# Patient Record
Sex: FEMALE | Race: WHITE | NOT HISPANIC OR LATINO | Employment: STUDENT | URBAN - METROPOLITAN AREA
[De-identification: names, ages, dates, MRNs, and addresses within clinical notes are randomized per-mention and may not be internally consistent; named-entity substitution may affect disease eponyms.]

---
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
## 2020-11-09 NOTE — ED PROVIDER NOTES
[facility] EMERGENCY DEPARTMENT ([facility])    10/20/17       History     Chief Complaint   Patient presents with     Alcohol Problem     Intoxicated, found by EMS, [place] alone [place] concert [place] ([place] Minimally responsive, vomiting not protecting airway.      The history is provided by the EMS personnel. The history is limited by the condition of the patient (AMS).     Evelyn Casanova is a 16 year old female who presents to the Emergency Department via EMS intoxicated with alcohol. Patient was at the [facility] Homecoming Concert drinking, when she vomited. EMS was called. Patient denies any other illicit drug use.    I have reviewed the Medications, Allergies, Past Medical and Surgical History, and Social History in the Epic system.    History reviewed. No pertinent past medical history.    History reviewed. No pertinent surgical history.    No family history on file.    Social History   Substance Use Topics     Smoking status: Unknown If Ever Smoked     Smokeless tobacco: Not on file      Comment: JOSHUA, minimally responsive.      Alcohol use Yes       Current Facility-Administered Medications   Medication     ondansetron (ZOFRAN) injection 4 mg     No current outpatient prescriptions on file.      Not on File      Review of Systems   Unable to perform ROS: Mental status change       Physical Exam   BP: (!) 118/104  Pulse: 62  Heart Rate: 65  Temp: 97.3  F (36.3  C)  Resp: 15  SpO2: 98 %      Physical Exam   Constitutional: No distress.   Sleeping, but arousable. Smells heavily of alcohol.   HENT:   Head: Normocephalic and atraumatic.   Right Ear: External ear normal.   Left Ear: External ear normal.   Nose: Nose normal.   Mouth/Throat: Oropharynx is clear and moist. No oropharyngeal exudate.   Eyes: Conjunctivae and EOM are normal. Pupils are equal, round, and reactive to light. No scleral icterus.   Neck: Normal range of motion. Neck supple.   Cardiovascular: Normal rate, normal heart  sounds and intact distal pulses.    Pulmonary/Chest: Effort normal and breath sounds normal. No respiratory distress. She has no wheezes. She has no rales.   Abdominal: Soft. Bowel sounds are normal. There is no tenderness.   Musculoskeletal: Normal range of motion. She exhibits no edema or tenderness.   Neurological: No cranial nerve deficit. She exhibits normal muscle tone. Coordination normal. GCS eye subscore is 3. GCS verbal subscore is 5. GCS motor subscore is 6.   Skin: Skin is warm. No rash noted. She is not diaphoretic.   Psychiatric:   Unable to assess due to altered mental status.   Nursing note and vitals reviewed.      ED Course   8:18 PM  The patient was seen and examined by Chance Blue MD in Room ED01.     ED Course     Procedures             Critical Care time:  none             Labs Ordered and Resulted from Time of ED Arrival Up to the Time of Departure from the ED   ALCOHOL ETHYL - Abnormal; Notable for the following:        Result Value    Ethanol g/dL 0.27 (*)     All other components within normal limits   HCG QUALITATIVE   PULSE OXIMETRY NURSING   PERIPHERAL IV CATHETER            Assessments & Plan (with Medical Decision Making)   16-year-old woman brought in by EMS from a homecoming concert due to altered mental status. Differential diagnosis: intoxication, pregnancy, unlikely trauma and intracranial hemorrhage.    After thorough history and physical examination, patient appears to be in no acute distress. She is intoxicated and smell heavily of alcohol. She did 1 episodes of nonbloody, nonbilious emesis on arrival and I will treat her with IV Zofran. Vital signs are normal. She ll be monitored until sobriety.    Alcohol level returned elevated at 0.27. Pregnancy test is negative. Patient is still intoxicated. She'll be singed out to my partner pending sobriety, re-evaluation, and disposition.    I have reviewed the nursing notes.    I have reviewed the findings, diagnosis, plan and need for  follow up with the patient.    New Prescriptions    No medications on file       Final diagnoses:   Alcoholic intoxication without complication (H)     IReji, am serving as a trained medical scribe to document services personally performed by Chance Blue MD, based on the provider's statements to me.   Chance HILL MD, was physically present and have reviewed and verified the accuracy of this note documented by Reji Harley.    10/20/2017   [facility], [place], EMERGENCY DEPARTMENT     Shruthi Blue MD  10/20/17 0326     Patient has made progress over the course of this hospitalization. Patient reports feeling better, presenting with better insight and judgement. Patient denied any suicidal or homicidal ideations. Patient denied any auditory or visual hallucinations. Patient is future oriented, optimistic and motivated to participate in Outpatient treatment. Patient is stable for discharge and shows no imminent danger to self, others at this time. Patient understands and verbalized agreement with treatment plan and following up with outpatient.

## 2022-01-06 ENCOUNTER — MEDICAL CORRESPONDENCE (OUTPATIENT)
Dept: HEALTH INFORMATION MANAGEMENT | Facility: CLINIC | Age: 22
End: 2022-01-06
Payer: COMMERCIAL

## 2022-01-06 ENCOUNTER — TRANSFERRED RECORDS (OUTPATIENT)
Dept: HEALTH INFORMATION MANAGEMENT | Facility: CLINIC | Age: 22
End: 2022-01-06
Payer: COMMERCIAL

## 2022-01-06 LAB
C TRACH DNA SPEC QL PROBE+SIG AMP: NOT DETECTED
N GONORRHOEA DNA SPEC QL PROBE+SIG AMP: NOT DETECTED
SPECIMEN DESCRIP: NORMAL
SPECIMEN DESCRIPTION: NORMAL

## 2022-01-13 ENCOUNTER — TRANSCRIBE ORDERS (OUTPATIENT)
Dept: OTHER | Age: 22
End: 2022-01-13
Payer: COMMERCIAL

## 2022-01-13 ENCOUNTER — TELEPHONE (OUTPATIENT)
Dept: OBGYN | Facility: CLINIC | Age: 22
End: 2022-01-13
Payer: COMMERCIAL

## 2022-01-13 DIAGNOSIS — Q52.4 ABNORMALITY OF HYMEN: Primary | ICD-10-CM

## 2022-01-13 NOTE — TELEPHONE ENCOUNTER
[facility] Call Center    Phone Message    May a detailed message be left on voicemail: yes     Reason for Call: Other: Twyla called stating that her doctor from [facility] told her that she needed to be seen in 1-2 days. Please call Twyla back to discuss, thank you!     Action Taken: Other: whs    Travel Screening: Not Applicable

## 2022-01-13 NOTE — TELEPHONE ENCOUNTER
Reviewed referral with Dr. Ash, unless bleeding is heavy, pt does not need to be seen within 1-2 days. Can offer reschedule in the next 1-2 weeks.    Called patient and left VM to call back to discuss further.

## 2022-01-14 ENCOUNTER — OFFICE VISIT (OUTPATIENT)
Dept: OBGYN | Facility: CLINIC | Age: 22
End: 2022-01-14
Attending: OBSTETRICS & GYNECOLOGY
Payer: COMMERCIAL

## 2022-01-14 ENCOUNTER — TRANSCRIBE ORDERS (OUTPATIENT)
Dept: OTHER | Age: 22
End: 2022-01-14
Payer: COMMERCIAL

## 2022-01-14 VITALS — SYSTOLIC BLOOD PRESSURE: 115 MMHG | HEART RATE: 87 BPM | WEIGHT: 173 LBS | DIASTOLIC BLOOD PRESSURE: 81 MMHG

## 2022-01-14 DIAGNOSIS — Q52.4 ABNORMALITY OF HYMEN: Primary | ICD-10-CM

## 2022-01-14 DIAGNOSIS — Q52.4 ABNORMALITY OF HYMEN: ICD-10-CM

## 2022-01-14 PROCEDURE — 11200 RMVL SKIN TAGS UP TO&INC 15: CPT | Performed by: STUDENT IN AN ORGANIZED HEALTH CARE EDUCATION/TRAINING PROGRAM

## 2022-01-14 PROCEDURE — 99203 OFFICE O/P NEW LOW 30 MIN: CPT | Mod: 25 | Performed by: STUDENT IN AN ORGANIZED HEALTH CARE EDUCATION/TRAINING PROGRAM

## 2022-01-14 PROCEDURE — G0463 HOSPITAL OUTPT CLINIC VISIT: HCPCS

## 2022-01-14 PROCEDURE — 56700 PRTL HYMNCTMY/REVJ HYMNL RNG: CPT | Mod: GC | Performed by: STUDENT IN AN ORGANIZED HEALTH CARE EDUCATION/TRAINING PROGRAM

## 2022-01-14 NOTE — LETTER
1/14/2022       RE: Twyla Mayen  [address] [facility] [id number]     Dear Colleague,    Thank you for referring your patient, Twyla Mayen, to the [facility] [place] [facility]. Please see a copy of my visit note below.    [facility]  Gynecology Visit    Reason for Consult: Hymenal remnant  Consulting Provider: Rodolfo HERNÁNDEZ CNP    HPI:    Twyla Mayen is a 21 year old G0, here for hymenal remnant. Patient reports that she had intercourse 1/2/22 and now has a piece of skin hanging from her vagina. She had previously had intercourse with a different partner and had not had issues. She reports that the recent encounter was consensual and non-painful but she had bleeding immediately afterward. Has continued to have spotting and irritation over the past two weeks as the tissue rubs on her clothing    GYN History  - LMP: No LMP recorded. (Menstrual status: Birth Control).  - Menses: amenorrhea due to cOCPs  - Pap Smears: Two years ago, reported normal  - Contraception: cOCPs  - Sexual Activity/Concerns: Sexually active with men. No concerns  - Hx STIs/UTIs: None, recent GC/CT negative    OBHx  OB History   No obstetric history on file.       Meds:   Current Outpatient Medications   Medication     norgestrel-ethinyl estradiol (LO/OVRAL) 0.3-30 MG-MCG tablet     No current facility-administered medications for this visit.     Allergies:  None      ROS: 10-Point ROS negative except as noted in HPI    Physical Exam  /81   Pulse 87   Wt 78.5 kg (173 lb)   Breastfeeding No   Gen: Well-appearing, NAD  Pelvic:  Normal appearing labial architecture. Normal hair distribution. At the introitus, at the 12 o'clock position there is a remnant of hymenal tissue about 4 cm in length with a 6mm base attachment. At the distal end of the hymenal remnant, their is granulation tissue.    Procedure Note.   After obtaining  consent patient was placed in dorsal lithotomy, exam was performed as above. Hymenal tissue was prepped with three swabs of betadine. Base of the hymenal tissue was infiltrated with  3 cc 1% lidocaine and clamped with a hema until blanching achieved. Scalpel was then used to remove the excess tissue. 4-0 Vicryl was used for suture ligation. Tissue was hemostatic.     Assessment/Plan:  Twyla Mayen is a 21 year old G0. female here for hymenal remnant. 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 Removed as per above. Patient instructed to avoid tampons, intercourse for next 1-2 weeks. Patient tolerated procedure well      Return to clinic as needed.    Staffed with Dr. Maricarmen Toribio MD  Obstetrics & Gynecology, PGY-4  1/14/2022 4:56 PM    Patient was seen by the resident in Continuity of Care Clinic.  I reviewed the history & was present for the patient's exam and procedure.  The patient's assessment and plan were made jointly.    Yulia Hernadez MD MPH

## 2022-01-14 NOTE — PROGRESS NOTES
[facility]  Gynecology Visit    Reason for Consult: Hymenal remnant  Consulting Provider: Rodolfo HERÁNNDEZ CNP    HPI:    Twyla Mayen is a 21 year old G0, here for hymenal remnant. Patient reports that she had intercourse 1/2/22 and now has a piece of skin hanging from her vagina. She had previously had intercourse with a different partner and had not had issues. She reports that the recent encounter was consensual and non-painful but she had bleeding immediately afterward. Has continued to have spotting and irritation over the past two weeks as the tissue rubs on her clothing    GYN History  - LMP: No LMP recorded. (Menstrual status: Birth Control).  - Menses: amenorrhea due to cOCPs  - Pap Smears: Two years ago, reported normal  - Contraception: cOCPs  - Sexual Activity/Concerns: Sexually active with men. No concerns  - Hx STIs/UTIs: None, recent GC/CT negative    OBHx  OB History   No obstetric history on file.       Meds:   Current Outpatient Medications   Medication     norgestrel-ethinyl estradiol (LO/OVRAL) 0.3-30 MG-MCG tablet     No current facility-administered medications for this visit.     Allergies:  None      ROS: 10-Point ROS negative except as noted in HPI    Physical Exam  /81   Pulse 87   Wt 78.5 kg (173 lb)   Breastfeeding No   Gen: Well-appearing, NAD  Pelvic:  Normal appearing labial architecture. Normal hair distribution. At the introitus, at the 12 o'clock position there is a remnant of hymenal tissue about 4 cm in length with a 6mm base attachment. At the distal end of the hymenal remnant, their is granulation tissue.    Procedure Note.   After obtaining consent patient was placed in dorsal lithotomy, exam was performed as above. Hymenal tissue was prepped with three swabs of betadine. Base of the hymenal tissue was infiltrated with  3 cc 1% lidocaine and clamped with a hema until blanching achieved. Scalpel was then used to remove the excess tissue. 4-0 Vicryl was  used for suture ligation. Tissue was hemostatic.     Assessment/Plan:  Twyla Mayen is a 21 year old G0. female here for hymenal remnant. 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 Removed as per above. Patient instructed to avoid tampons, intercourse for next 1-2 weeks. Patient tolerated procedure well      Return to clinic as needed.    Staffed with Dr. Maricarmen Toribio MD  Obstetrics & Gynecology, PGY-4  1/14/2022 4:56 PM    Patient was seen by the resident in Continuity of Care Clinic.  I reviewed the history & was present for the patient's exam and procedure.  The patient's assessment and plan were made jointly.    Yulia Hernadez MD MPH

## 2022-08-25 ENCOUNTER — OFFICE VISIT (OUTPATIENT)
Dept: INTERNAL MEDICINE | Facility: CLINIC | Age: 22
End: 2022-08-25
Payer: COMMERCIAL

## 2022-08-25 VITALS
SYSTOLIC BLOOD PRESSURE: 119 MMHG | WEIGHT: 196.3 LBS | HEIGHT: 67 IN | OXYGEN SATURATION: 96 % | DIASTOLIC BLOOD PRESSURE: 78 MMHG | HEART RATE: 72 BPM | BODY MASS INDEX: 30.81 KG/M2 | RESPIRATION RATE: 16 BRPM

## 2022-08-25 DIAGNOSIS — Z71.84 COUNSELING FOR TRAVEL: ICD-10-CM

## 2022-08-25 DIAGNOSIS — Z71.84 COUNSELING FOR TRAVEL: Primary | ICD-10-CM

## 2022-08-25 DIAGNOSIS — Z23 NEED FOR DIPHTHERIA-TETANUS-PERTUSSIS (TDAP) VACCINE: ICD-10-CM

## 2022-08-25 PROCEDURE — 90471 IMMUNIZATION ADMIN: CPT | Performed by: NURSE PRACTITIONER

## 2022-08-25 PROCEDURE — 90715 TDAP VACCINE 7 YRS/> IM: CPT | Performed by: NURSE PRACTITIONER

## 2022-08-25 PROCEDURE — 90472 IMMUNIZATION ADMIN EACH ADD: CPT | Performed by: NURSE PRACTITIONER

## 2022-08-25 PROCEDURE — 99402 PREV MED CNSL INDIV APPRX 30: CPT | Mod: 25 | Performed by: NURSE PRACTITIONER

## 2022-08-25 PROCEDURE — 90691 TYPHOID VACCINE IM: CPT | Performed by: NURSE PRACTITIONER

## 2022-08-25 RX ORDER — ACETAZOLAMIDE 250 MG/1
TABLET ORAL
Qty: 6 TABLET | Refills: 0 | Status: SHIPPED | OUTPATIENT
Start: 2022-08-25 | End: 2022-08-31

## 2022-08-25 RX ORDER — AZITHROMYCIN 500 MG/1
TABLET, FILM COATED ORAL
Qty: 3 TABLET | Refills: 0 | Status: SHIPPED | OUTPATIENT
Start: 2022-08-25 | End: 2023-01-30

## 2022-08-25 RX ORDER — ATOVAQUONE AND PROGUANIL HYDROCHLORIDE 250; 100 MG/1; MG/1
1 TABLET, FILM COATED ORAL DAILY
Qty: 12 TABLET | Refills: 0 | Status: SHIPPED | OUTPATIENT
Start: 2022-08-25 | End: 2023-01-30

## 2022-08-25 NOTE — NURSING NOTE
"Twyla Mayen is a 21 year old female patient that presents today in clinic for the following:    Chief Complaint   Patient presents with     Travel Consultation     [place]     The patient's allergies and medications were reviewed as noted. A set of vitals were recorded as noted without incident: /78 (BP Location: Right arm, Patient Position: Sitting, Cuff Size: Adult Regular)   Pulse 72   Resp 16   Ht 1.702 m (5' 7\")   Wt 89 kg (196 lb 4.8 oz)   SpO2 96%   Breastfeeding No   BMI 30.74 kg/m  . The patient does not have any other questions for the provider.    Eric Mary, EMT at 2:28 PM on 8/25/2022  "

## 2022-08-25 NOTE — PATIENT INSTRUCTIONS
Creating a Medical Travel Kit    Every busy traveler should have a kit containing personal care and medical items that can be easily tossed into a suitcase.  Quantities can be adjusted for the length of travel, for destinations, and availability of replacement at your destination.    Plan to see your Travel medicine Clinic 4-6 weeks prior to departure as some vaccines require time to become effective.     Items to Pack:    -Basic antibiotics  -Mild laxatives: Metamucil or Senokot, available without a prescription.  -Anti-diarrheal medication: Imodium AD or Pepto Bismol tablets ( 2 tablet by mouth beofr meals reduces risk by 40-50%).  -Medical thermometer  -Antacid tablets  -Band aides, guaze bandages, ace wrap, adhesive tape, antibiotic ointment  -Topical ointment or creams for rash or bites (over the counter hydrocortisone cream).    -Motion sickness: Dramamine or Antivert (Meclazine)tablets, Sea bands  available at [facility] pharmacies over the counter.  -Aspirin, acetaminophen(tylenol) ibuprofen  -Cough medicine or drops  -Anti-fungal foot powder  -Nasal spray or decongestants  -Eyeglasses.  Take an extra pair or copy of your prescription.  _Insect repellent with 30% DEET time release, such as Ultrathon or Pulliam products. Available at [employer]  -Permethrin clothing insecticide treatment.Available at [employer] on line  -Ear Plugs  -Sunscreen  -Sunglasses, Hat  -Safety pins, toilet paper, money belt, padlock, duct tape    Regular Medications should be left in original containers and carried in your carry on bag. Take enough with you for the entire trip. You might need a letter to give to your insurance to obtain a sufficient amount for your trip.      Carry with you the following info: Known allergies, medical conditions, medications, name of your insurance company and policy number, blood type if known, and phone numbers for emergency [email or web address]          Travel Websites:     Wwwnc.cdc/gov  Wwwnc.cdc.gov/travel  Wwwnc.cdc.gov/travelt    Medical resources  Medical Alert [facility]  [phone]  Provides Medical Alert Tags    Passport Emergency Services [phone] (9 am TO 5 pm EST)  [phone] (after hours)  Deals with emergencies relating to passport problems.    [facility]  [phone]( Mon-Fri 8:30-10pm EST)  [phone] Emergencies only, 24 hour facilitator)  [phone] ( travel advisories recording) Provides information on current health conditions around the world.

## 2022-08-29 ENCOUNTER — TELEPHONE (OUTPATIENT)
Dept: OBGYN | Facility: CLINIC | Age: 22
End: 2022-08-29

## 2022-08-29 NOTE — TELEPHONE ENCOUNTER
[facility] Call Center    Phone Message    May a detailed message be left on voicemail: yes     Reason for Call: Other: Pt called and stated that she has been having abnormal bleeding - doesnt believe she is pregnant - is leaving for [place] on Friday and is wanting to make sure its OK to wait to be seen until after she comes home - please call pt to further discuss, thanks!     Action Taken: Message routed to:  Other: [facility]    Travel Screening: Not Applicable

## 2022-08-31 RX ORDER — ACETAZOLAMIDE 250 MG/1
TABLET ORAL
Qty: 6 TABLET | Refills: 0 | Status: SHIPPED | OUTPATIENT
Start: 2022-08-31 | End: 2023-01-30

## 2022-08-31 NOTE — TELEPHONE ENCOUNTER
ACETAZOLAMIDE 250 MG TABLET        Pharmacy comment: Script Clarification:PLEASE SPECIFY HOW MANY TABLETS THE PATIENT ID USING DAILY.  Skylar Sahu RN  Central Triage Red Flags/Med Refills

## 2022-09-17 ENCOUNTER — HEALTH MAINTENANCE LETTER (OUTPATIENT)
Age: 22
End: 2022-09-17

## 2022-12-08 NOTE — PROGRESS NOTES
Spoke to pt's mother Debi. Notified pt's mother to take pt to an urgent care per Dr. Campos's verbal. Pt's mother verbalized understanding with no further questions or concerns.    Twyla Mayen seen today alone in preparation for travel to [place], leaving on 9/2/22 and staying for 21 days. .Detailed itinerary: She arrives in [place] 9/2/22 and from there will travel to nearby [facility], and then [facility].. Then go to altitude at [facility] 4-5 days., then [facility] and back yo [facility], then4 days walking the [place] to [facility] and then back to [facility], and then [place].   Patient will be staying malarial and yellow fever  areas in [facility]. Reason for travel: pleasure.    Country of birth:[place].  She will be traveling with:her sister, and joining up with various tour groups. Planned activities during travel: visit to altitude, hiking.    She has never spent much time at significant altitude.    Risk level qualifier:    .    No past medical history on file.    There is no problem list on file for this patient.    Current Medical Problems: none    Immunization History   Administered Date(s) Administered     COVID-19,PF,Moderna 03/11/2021, 04/07/2021, 11/01/2021     DTaP, Unspecified 01/19/1999, 04/05/1999, 06/21/1999, 05/03/2000, 01/21/2004, 04/20/2010     HPV Quadrivalent 09/17/2012, 11/20/2012, 03/26/2013     Hep B, Peds or Adolescent 1998, 1998, 10/06/1999     HepA-Adult 05/18/2009, 01/05/2010     HepB-Adult 06/19/2019     Hib (PRP-T) 01/19/1999, 04/05/1999, 06/21/1999, 05/03/2000     Influenza Vaccine IM > 6 months Valent IIV4 (Alfuria,Fluzone) 10/05/2017, 10/02/2018, 09/16/2019, 09/24/2020, 09/15/2021     Influenza Vaccine, 6+MO IM (QUADRIVALENT W/PRESERVATIVES) 09/17/2012     Meningococcal Mcv4 Conjugate,unspecified  04/20/2010, 07/20/2016     Pneumococcal (PCV 7) 05/03/2000, 12/23/2000     Poliovirus, inactivated (IPV) 01/19/1999, 04/05/1999, 01/19/2000, 01/21/2004     Rotavirus, Unspecified Formulation 01/19/1999, 04/05/1999, 06/21/1999     TDAP Vaccine (Boostrix) 07/20/2016     Tdap (Adacel,Boostrix) 08/25/2022     Typhoid IM 08/25/2022     Varicella 01/21/2004,  "04/24/2007         REVIEW OF SYSTEMS:  ROS:9 system ROS reviewed w/o changes except for above        PE: /78 (BP Location: Right arm, Patient Position: Sitting, Cuff Size: Adult Regular)   Pulse 72   Resp 16   Ht 1.702 m (5' 7\")   Wt 89 kg (196 lb 4.8 oz)   SpO2 96%   Breastfeeding No   BMI 30.74 kg/m        General: well-nourished, well-developed female in no acute distress.    ASSESSMENT AND PLAN:    Twyla Mayen 21 year old old female whom I had the pleasure of seeing today for counseling for international travel.    Counseling  for travel:   Twyla was seen today for travel consultation.    Diagnoses and all orders for this visit:    Counseling for travel  -     azithromycin (ZITHROMAX) 500 MG tablet; 1 tab daily until diarrhea resolves.  -     atovaquone-proguanil (MALARONE) 250-100 MG tablet; Take 1 tablet by mouth daily Start 2 days before travel and continue 7 days after return.  -     TYPHOID VACCINE, IM  -     acetaZOLAMIDE (DIAMOX) 250 MG tablet; Start the day prior to ascending to 9000 ft.         -   -     She will be in a Yellow fever low risk area for 4 days and will use repellent and permethrine reated outerware    Need for diphtheria-tetanus-pertussis (Tdap) vaccine  -     TDAP (ADACEL,BOOSTRIX)      Counseling on malaria and other insect borne diseases    We also discussed general precautions for mosquito, other insect borne and ecto-parasite avoidance.    Counseling on traveler's diarrhea    We discussed precautions for clean water and food preparation as follows:  Recommended bottled or boiled water, including ice, and for tooth brushing.  Peel it, boil it, cook it, or forget it  Gave handout on traveler's diarrhea.  Handwashing or use of sanitizers several times daily and prior to eating  We discussed medications for management of traveler's diarrhea, once symptomatic:  Gave handout to patient.  Recommended going to clinic, if dehydrated or if high fever and/or blood in " "stool.  Recommended loperamide (Imodium, an antimotility agent), for diarrhea without fever\"}    Counseling on the epidemiology of travel related morbidity and mortality    Discussed precautions for accident avoidance.    Discussed flying: drinking plenty of fluids and exercising legs every 2 hours    Discussed health concerns overseas.    Gave copy of CDC recommendations.    Discussed safety and security:     Other counseling for travel    Recommended checking medicine cabinet - looking for any meds normally used at home for common illnesses.    Provided education regarding sun exposure and use of high SPF sunscreen.    Discussed avoidance of blood and body secretions.     High altitude sickness: discussed.    Motion sickness.    Jet lag: Not an issue.    HIV Post Exposure : aware                Creating a Medical Travel Kit    Every busy traveler should have a kit containing personal care and medical items that can be easily tossed into a suitcase.  Quantities can be adjusted for the length of travel, for destinations, and availability of replacement at your destination.    Plan to see your Travel medicine Clinic 4-6 weeks prior to departure as some vaccines require time to become effective.     Items to Pack:    -Basic antibiotics  -Mild laxatives: Metamucil or Senokot, available without a prescription.  -Anti-diarrheal medication: Imodium AD or Pepto Bismol, available in tablet form without a prescription.  -Medical thermometer  -Antacid tablets  -Band aides, guaze bandages, ace wrap, adhesive tape, antibiotic ointment  -Topical ointment or creams for rash or bites (over the counter hydrocortisone cream).    -Motion sickness: Dramamine or Antivert (Meclazine)tablets  -Aspirin, acetaminophen(tylenol) ibuprofen  -Cough medicine or drops  -Anti-fungal foot powder  -Nasal spray or decongestants  -Eyeglasses.  Take an extra pair or copy of your prescription.  _Insect repellent with 30% DEET time release, such as " Ultrathon or Pulliam products. Available at [employer]  -Permethrin clothing insecticide treatment.Available at [employer]  -Ear Plugs  -Sunscreen  -Sunglasses, Hat  -Safety pins, toilet paper, money belt, padlock, duct tape    Regular Medications should be left in original containers and carried in your carry on bag. Take enough with you for the entire trip. You might need a letter to give to your insurance to obtain a sufficient amount for your trip.      Carry with you the following info: Known allergies, medical conditions, medications, name of your insurance company and policy number, blood type if known, and phone numbers for emergency contact.        Medical resources  Medical Alert [facility]  [phone]  Provides Medical Alert Tags    Passport Emergency Services [phone] (9 am TO 5 pm EST)  [phone] (after hours)  Deals with emergencies relating to passport problems.    [facility]  [phone]( Mon-Fri 8:30-10pm EST)  [phone] Emergencies only, 24 hour facilitator)  [phone] ( travel advisories recording) Provides information on current health conditions around the world.    FOLLOW-UP PLAN:     Please see after visit summary for details regarding the patient's planned vaccination schedule.  For any labs ordered, we will contact the patient with a plan for further care.  The patient was also encouraged to be seen for any post-travel illness, or with future travel plans.    The total time spent during this visit for individual counseling was 40 minutes, all of which was spent in counseling time, including reviewing the past medical and vaccination history, the travel itinerary, the risks of travel and suggested precautions, and the recommended vaccines and medicines with their side effects -- all for upcoming travel.     Marion HERNÁNDEZ, CNP

## 2023-01-30 NOTE — PROGRESS NOTES
"[facility]    HPI: Twyal Mayen is a 22 year old G0 who presents to clinic today to discuss irregular menstrual bleeding.     In 7/22 had return of menses after 5 years without bleeding. Has been on continuous OCPs and prior to the last few months they have worked well for her. No problems with breakthrough bleeding in the past. When bleeding returned, started to occur every 2 weeks for a number of months. Last episode of bleeding was in December. Bleeding lasts about 5 days and seems to occur at the end of one pill pack and half way through the next. Feels a bit heavier than a normal period. Has associated pelvic cramping which is very uncomfortable along with mild mood changes/irritability. Prior to cOCPs had regular periods which were painful. Does feel she has gained weight in the last year and has been feeling more fatigued. She does not smoke. No personal history of VTE or PE. Does report that her father has familial cavernoma disease (?) and has had blood clots in the past. She has not been tested for this.     Agreeable to pap test and Gc/Ch screening today.     OB History:   G0     Gyn History:   Menses: bleeding q5frytw   STI Hx: None   Contraception: cOCPs  Sexual activity: yes, one sperm producing partner   Pap smear history: 2020 NILM (results not available)  S/p hymenal revision 1/14/22    Labs:   Gc/Ch neg 1/2022    Imaging:   None     PMH, PSH, Family history, Social history, medications and allergies were reviewed and updated in EMR.     Objective:   /72 (BP Location: Right arm, Patient Position: Chair)   Pulse 68   Ht 1.702 m (5' 7\")   Wt 94.3 kg (208 lb)   BMI 32.58 kg/m    General: Healthy appearing. Alert, oriented. Affect is appropriate.   HEENT: Eyes are normal with clear sclerae. Ears are symmetric.   Heart: Regular rate, well perfused.   Lungs: Breathing is unlabored.   Abdomen: Soft, nontender, without masses  Skin: Warm and dry without malignant " appearing lesions.   Pelvic exam: External genitalia without normal limits. Moist, rugated vaginal mucosa with physiologic discharge. Cervix is nulliparous, without lesions. A pap smear was obtained. Extremities: Skin is warm and dry. Lower extremities without varicosities or edema.   Neuro: Sensory and motor exam grossly intact.     Assessment/Plan:   Twyla Mayen is a 22 year old G0 female with breakthrough bleeding on continuous OCPs. Exam wnl, pap updated today, GC/CT collected. Pt uses OCPs for cycle control and contraception. Discussed other contraceptive options that could also impact menses today, pt desires to continue with OCPs for the time being. Did mention that her father has familial cavernosa disease (?) with h/o clots; she has not had a blood clot in the past but discussed that estrogen-containing contraceptive options may put her at higher risk for this if she has inherited the disease. Will look into it further and contact her via [employer] if we need to discuss another option.   - pap and Gc/Ch collected today  - CBC and TSH ordered   - discussed measures for control of breakthrough bleeding on cOCPs including trial of NSAIDs 600 mg q6h for 5 days as well as a break from OCPs x4 days as long as she has been taking them continuously for 3 weeks prior. She wishes to try these prior to switching to a different pill/method.     Janice Broussard MD  OB/GYN PGY-1  1/31/2023 10:03 AM     I examined Twyla Mayen on 1/31/2023 with Dr. Broussard and agree with the presentation, exam and plan of care documented in this note with edits by me.    Virginia Almanza MD

## 2023-01-31 ENCOUNTER — OFFICE VISIT (OUTPATIENT)
Dept: OBGYN | Facility: CLINIC | Age: 23
End: 2023-01-31
Attending: STUDENT IN AN ORGANIZED HEALTH CARE EDUCATION/TRAINING PROGRAM
Payer: COMMERCIAL

## 2023-01-31 ENCOUNTER — LAB (OUTPATIENT)
Dept: LAB | Facility: CLINIC | Age: 23
End: 2023-01-31
Attending: STUDENT IN AN ORGANIZED HEALTH CARE EDUCATION/TRAINING PROGRAM
Payer: COMMERCIAL

## 2023-01-31 VITALS
BODY MASS INDEX: 32.65 KG/M2 | SYSTOLIC BLOOD PRESSURE: 112 MMHG | WEIGHT: 208 LBS | HEART RATE: 68 BPM | DIASTOLIC BLOOD PRESSURE: 72 MMHG | HEIGHT: 67 IN

## 2023-01-31 DIAGNOSIS — R63.5 WEIGHT GAIN: ICD-10-CM

## 2023-01-31 DIAGNOSIS — N92.6 IRREGULAR MENSES: ICD-10-CM

## 2023-01-31 DIAGNOSIS — R53.83 OTHER FATIGUE: ICD-10-CM

## 2023-01-31 DIAGNOSIS — N92.6 IRREGULAR MENSES: Primary | ICD-10-CM

## 2023-01-31 DIAGNOSIS — Z12.4 CERVICAL CANCER SCREENING: ICD-10-CM

## 2023-01-31 DIAGNOSIS — Z30.41 SURVEILLANCE OF PREVIOUSLY PRESCRIBED CONTRACEPTIVE PILL: ICD-10-CM

## 2023-01-31 DIAGNOSIS — Z11.3 ROUTINE SCREENING FOR STI (SEXUALLY TRANSMITTED INFECTION): ICD-10-CM

## 2023-01-31 LAB
ERYTHROCYTE [DISTWIDTH] IN BLOOD BY AUTOMATED COUNT: 12 % (ref 10–15)
HCT VFR BLD AUTO: 39.9 % (ref 35–47)
HGB BLD-MCNC: 13.3 G/DL (ref 11.7–15.7)
MCH RBC QN AUTO: 30.2 PG (ref 26.5–33)
MCHC RBC AUTO-ENTMCNC: 33.3 G/DL (ref 31.5–36.5)
MCV RBC AUTO: 91 FL (ref 78–100)
PLATELET # BLD AUTO: 245 10E3/UL (ref 150–450)
RBC # BLD AUTO: 4.41 10E6/UL (ref 3.8–5.2)
TSH SERPL DL<=0.005 MIU/L-ACNC: 1.01 MU/L (ref 0.4–4)
WBC # BLD AUTO: 8.9 10E3/UL (ref 4–11)

## 2023-01-31 PROCEDURE — 99214 OFFICE O/P EST MOD 30 MIN: CPT | Mod: 25 | Performed by: STUDENT IN AN ORGANIZED HEALTH CARE EDUCATION/TRAINING PROGRAM

## 2023-01-31 PROCEDURE — 99214 OFFICE O/P EST MOD 30 MIN: CPT | Mod: GC | Performed by: STUDENT IN AN ORGANIZED HEALTH CARE EDUCATION/TRAINING PROGRAM

## 2023-01-31 PROCEDURE — 87491 CHLMYD TRACH DNA AMP PROBE: CPT | Performed by: STUDENT IN AN ORGANIZED HEALTH CARE EDUCATION/TRAINING PROGRAM

## 2023-01-31 PROCEDURE — 84443 ASSAY THYROID STIM HORMONE: CPT

## 2023-01-31 PROCEDURE — 36415 COLL VENOUS BLD VENIPUNCTURE: CPT

## 2023-01-31 PROCEDURE — 85027 COMPLETE CBC AUTOMATED: CPT

## 2023-01-31 PROCEDURE — G0145 SCR C/V CYTO,THINLAYER,RESCR: HCPCS | Performed by: STUDENT IN AN ORGANIZED HEALTH CARE EDUCATION/TRAINING PROGRAM

## 2023-01-31 ASSESSMENT — PATIENT HEALTH QUESTIONNAIRE - PHQ9
5. POOR APPETITE OR OVEREATING: SEVERAL DAYS
SUM OF ALL RESPONSES TO PHQ QUESTIONS 1-9: 9

## 2023-01-31 ASSESSMENT — ANXIETY QUESTIONNAIRES
GAD7 TOTAL SCORE: 4
GAD7 TOTAL SCORE: 4
5. BEING SO RESTLESS THAT IT IS HARD TO SIT STILL: NOT AT ALL
1. FEELING NERVOUS, ANXIOUS, OR ON EDGE: SEVERAL DAYS
3. WORRYING TOO MUCH ABOUT DIFFERENT THINGS: SEVERAL DAYS
7. FEELING AFRAID AS IF SOMETHING AWFUL MIGHT HAPPEN: NOT AT ALL
6. BECOMING EASILY ANNOYED OR IRRITABLE: NOT AT ALL
2. NOT BEING ABLE TO STOP OR CONTROL WORRYING: SEVERAL DAYS

## 2023-01-31 NOTE — PATIENT INSTRUCTIONS
Thank you for trusting us with your care!     If you need to contact us for questions about:  Symptoms, Scheduling & Medical Questions; Non-urgent (2-3 day response) Cecily message, Urgent (needing response today) [phone] (if after 3:30pm next day response)   Prescriptions: Please call your Pharmacy   Billing: Felix [phone] or  Physicians:[phone]    Try using 600mg of ibuprofen every 6 hrs for 5 days to help decrease the breakthrough bleeding.     You can also do a 4 day break in the birth control pills if you have been using them continuously for at least 3 weeks beforehand.

## 2023-01-31 NOTE — LETTER
Date:January 31, 2023      Provider requested that no letter be sent. Do not send.       [facility]

## 2023-01-31 NOTE — LETTER
"1/31/2023       RE: Twyla Mayen  [address] [address] [address]     Dear Colleague,    Thank you for referring your patient, Twyla Mayen, to the [facility] at [facility]. Please see a copy of my visit note below.    [facility]    HPI: Twyla Mayen is a 22 year old G0 who presents to clinic today to discuss irregular menstrual bleeding.     In 7/22 had return of menses after 5 years without bleeding. Has been on continuous OCPs and prior to the last few months they have worked well for her. No problems with breakthrough bleeding in the past. When bleeding returned, started to occur every 2 weeks for a number of months. Last episode of bleeding was in December. Bleeding lasts about 5 days and seems to occur at the end of one pill pack and half way through the next. Feels a bit heavier than a normal period. Has associated pelvic cramping which is very uncomfortable along with mild mood changes/irritability. Prior to cOCPs had regular periods which were painful. Does feel she has gained weight in the last year and has been feeling more fatigued. She does not smoke. No personal history of VTE or PE. Does report that her father has familial cavernoma disease (?) and has had blood clots in the past. She has not been tested for this.     Agreeable to pap test and Gc/Ch screening today.     OB History:   G0     Gyn History:   Menses: bleeding [id number]   STI Hx: None   Contraception: cOCPs  Sexual activity: yes, one sperm producing partner   Pap smear history: 2020 NILM (results not available)  S/p hymenal revision 1/14/22    Labs:   Gc/Ch neg 1/2022    Imaging:   None     PMH, PSH, Family history, Social history, medications and allergies were reviewed and updated in EMR.     Objective:   /72 (BP Location: Right arm, Patient Position: Chair)   Pulse 68   Ht 1.702 m (5' 7\")   Wt 94.3 kg (208 " lb)   BMI 32.58 kg/m    General: Healthy appearing. Alert, oriented. Affect is appropriate.   HEENT: Eyes are normal with clear sclerae. Ears are symmetric.   Heart: Regular rate, well perfused.   Lungs: Breathing is unlabored.   Abdomen: Soft, nontender, without masses  Skin: Warm and dry without malignant appearing lesions.   Pelvic exam: External genitalia without normal limits. Moist, rugated vaginal mucosa with physiologic discharge. Cervix is nulliparous, without lesions. A pap smear was obtained. Extremities: Skin is warm and dry. Lower extremities without varicosities or edema.   Neuro: Sensory and motor exam grossly intact.     Assessment/Plan:   Twyla Mayen is a 22 year old G0 female with breakthrough bleeding on continuous OCPs. Exam wnl, pap updated today, GC/CT collected. Pt uses OCPs for cycle control and contraception. Discussed other contraceptive options that could also impact menses today, pt desires to continue with OCPs for the time being. Did mention that her father has familial cavernosa disease (?) with h/o clots; she has not had a blood clot in the past but discussed that estrogen-containing contraceptive options may put her at higher risk for this if she has inherited the disease. Will look into it further and contact her via [employer] if we need to discuss another option.   - pap and Gc/Ch collected today  - CBC and TSH ordered   - discussed measures for control of breakthrough bleeding on cOCPs including trial of NSAIDs 600 mg q6h for 5 days as well as a break from OCPs x4 days as long as she has been taking them continuously for 3 weeks prior. She wishes to try these prior to switching to a different pill/method.     Janice Broussard MD  OB/GYN PGY-1  1/31/2023 10:03 AM     I examined Twyla Mayen on 1/31/2023 with Dr. Broussard and agree with the presentation, exam and plan of care documented in this note with edits by me.    Virginia Almanza MD               Again, thank you for allowing me  to participate in the care of your patient.      Sincerely,    Virginia Almanza MD

## 2023-02-01 LAB
C TRACH DNA SPEC QL PROBE+SIG AMP: NEGATIVE
N GONORRHOEA DNA SPEC QL NAA+PROBE: NEGATIVE

## 2023-02-02 LAB
BKR LAB AP GYN ADEQUACY: NORMAL
BKR LAB AP GYN INTERPRETATION: NORMAL
BKR LAB AP HPV REFLEX: NO
BKR LAB AP PREVIOUS ABNORMAL: NORMAL
PATH REPORT.COMMENTS IMP SPEC: NORMAL
PATH REPORT.COMMENTS IMP SPEC: NORMAL
PATH REPORT.RELEVANT HX SPEC: NORMAL

## 2023-02-04 ENCOUNTER — MYC MEDICAL ADVICE (OUTPATIENT)
Dept: OBGYN | Facility: CLINIC | Age: 23
End: 2023-02-04
Payer: COMMERCIAL

## 2023-02-04 DIAGNOSIS — Z30.41 SURVEILLANCE OF PREVIOUSLY PRESCRIBED CONTRACEPTIVE PILL: Primary | ICD-10-CM

## 2023-02-06 RX ORDER — NORETHINDRONE ACETATE AND ETHINYL ESTRADIOL 1MG-20(21)
1 KIT ORAL DAILY
Qty: 90 TABLET | Refills: 1 | Status: SHIPPED | OUTPATIENT
Start: 2023-02-06

## 2023-02-23 ENCOUNTER — OFFICE VISIT (OUTPATIENT)
Dept: DERMATOLOGY | Facility: CLINIC | Age: 23
End: 2023-02-23
Payer: COMMERCIAL

## 2023-02-23 DIAGNOSIS — D48.5 NEOPLASM OF UNCERTAIN BEHAVIOR OF SKIN: ICD-10-CM

## 2023-02-23 DIAGNOSIS — D22.9 MULTIPLE BENIGN NEVI: Primary | ICD-10-CM

## 2023-02-23 PROCEDURE — 99203 OFFICE O/P NEW LOW 30 MIN: CPT | Mod: GC | Performed by: DERMATOLOGY

## 2023-02-23 ASSESSMENT — PAIN SCALES - GENERAL: PAINLEVEL: NO PAIN (0)

## 2023-02-23 NOTE — PATIENT INSTRUCTIONS
Patient Education     Checking for Skin Cancer  You can find cancer early by checking your skin each month. There are 3 kinds of skin cancer. They are melanoma, basal cell carcinoma, and squamous cell carcinoma. Doing monthly skin checks is the best way to find new marks or skin changes. Follow the instructions below for checking your skin.   The ABCDEs of checking moles for melanoma   Check your moles or growths for signs of melanoma using ABCDE:   Asymmetry: the sides of the mole or growth don t match  Border: the edges are ragged, notched, or blurred  Color: the color within the mole or growth varies  Diameter: the mole or growth is larger than 6 mm (size of a pencil eraser)  Evolving: the size, shape, or color of the mole or growth is changing (evolving is not shown in the images below)    Checking for other types of skin cancer  Basal cell carcinoma or squamous cell carcinoma have symptoms such as:     A spot or mole that looks different from all other marks on your skin  Changes in how an area feels, such as itching, tenderness, or pain  Changes in the skin's surface, such as oozing, bleeding, or scaliness  A sore that does not heal  New swelling or redness beyond the border of a mole    Who s at risk?  Anyone can get skin cancer. But you are at greater risk if you have:   Fair skin, light-colored hair, or light-colored eyes  Many moles or abnormal moles on your skin  A history of sunburns from sunlight or tanning beds  A family history of skin cancer  A history of exposure to radiation or chemicals  A weakened immune system  If you have had skin cancer in the past, you are at risk for recurring skin cancer.   How to check your skin  Do your monthly skin checkups in front of a full-length mirror. Check all parts of your body, including your:   Head (ears, face, neck, and scalp)  Torso (front, back, and sides)  Arms (tops, undersides, upper, and lower armpits)  Hands (palms, backs, and fingers, including  under the nails)  Buttocks and genitals  Legs (front, back, and sides)  Feet (tops, soles, toes, including under the nails, and between toes)  If you have a lot of moles, take digital photos of them each month. Make sure to take photos both up close and from a distance. These can help you see if any moles change over time.   Most skin changes are not cancer. But if you see any changes in your skin, call your doctor right away. Only he or she can diagnose a problem. If you have skin cancer, seeing your doctor can be the first step toward getting the treatment that could save your life.   [employer] last reviewed this educational content on 4/1/2019 2000-2020 [employer]. [address] [place] [address], [place] [address]. All rights reserved. This information is not intended as a substitute for professional medical care. Always follow your healthcare professional's instructions.       When should I call my doctor?  If you are worsening or not improving, please, contact us or seek urgent care as noted below.     Who should I call with questions (adults)?  [facility] (adult and pediatric): [phone]  Health system (adult): [phone]  For urgent needs outside of business hours call the [facility] at [phone] and ask for the dermatology resident on call to be paged  If this is a medical emergency and you are unable to reach an ER, Call [phone]    Who should I call with questions (pediatric)?  [facility]- Pediatric Dermatology  Dr. Jenifer Otoole, Dr. Kem Arriaga, Dr. Adina Trujillo, PETE Vazquez, Dr. Georgia Isidro, Dr. Carla Morel & Dr. Kenrick Echols  Non-urgent nurse triage line; [phone]- Elly and Bharati DIAZ Care Coordinatorfrank Cheung (/Complex ) [phone]    If you need a prescription refill, please contact your pharmacy. Refills are approved or denied by our  Physicians during normal business hours, Monday through Fridays  Per office policy, refills will not be granted if you have not been seen within the past year (or sooner depending on your child's condition)    Scheduling Information:  Pediatric Appointment Scheduling and Call Center [phone]  Radiology Scheduling- [phone]  Sedation Unit Scheduling- [phone]  [place] Scheduling- General [phone]; Pediatric Dermatology [phone]  Main  Services: [phone]  Amharic: [phone]  [place] citizen: [phone]  Hmong/Israeli/Albanian: [phone]  Preadmission Nursing Department Fax Number: [phone] (Fax all pre-operative paperwork to this number)    For urgent matters arising during evenings, weekends, or holidays that cannot wait for normal business hours please call [phone] and ask for the dermatology resident on call to be paged.

## 2023-02-23 NOTE — NURSING NOTE
Dermatology Rooming Note    Twyla Mayen's goals for this visit include:   Chief Complaint   Patient presents with     Skin Check     Twyla is here today for a skin check - spot on left arm     MCKAYLA Ramirez

## 2023-02-23 NOTE — PROGRESS NOTES
[facility] Dermatology Note  Encounter Date: Feb 23, 2023  Office Visit     Dermatology Problem List:  # Cherry angioma vs traumatized nevus, L upper arm  - Photos 2/23/23.     ____________________________________________    Assessment & Plan:   # Cherry angioma vs traumatized nevus, L upper arm  - Discussed benign appearance of the above lesion. Does not require any treatment today.   - Recommended recheck in 6-12 months to ensure not growing or changing.   - Photos today.     # Multiple benign nevi, arms  - Reassured benign etiology. No treatment required.   - Discussed signs and symptoms of skin cancers and ABCDEs of melanoma.      Procedures Performed:   None    Follow-up: 6-12 months for recheck of L upper arm lesion.     Staff and Resident:     Resident:  I saw and discussed the patient with the attending physician, Dr. Cody Meza MD, PhD  PGY-2 Dermatology Resident     I have seen and examined this patient and agree with the assessment and plan as documented in the resident's note.    Brandon Ross MD  Dermatology Attending    ____________________________________________    CC: changing mole on arm    HPI:  Ms. Twyla Mayen is a(n) 22 year old female who presents today as a new patient for lesion of concern on L upper arm:    - Reports changing mole on L upper arm. Thinks it used to be brown like her other mole but in the past week has turned red. She does not think she's scratched it. It is not painful or bleeding. Has a few other moles on her arms she would like to have checked as well. Does not desire full skin check today.  - No personal history of skin cancer. Does not recall anyone in the family with melanoma.  - Generally wears sunscreen. No history of tanning bed use. A few bad sunburns in the past.       Labs Reviewed:  N/A    Physical Exam:  Vitals: There were no vitals taken for this visit.  SKIN: Focused examination of arms was performed.  - L upper arm  with ~2-3mm pink to bright red papule, somewhat vascular appearance on dermoscopy.   - Uniform-appearing, light brown macules and papules on the arms with benign dermatoscopic appearance.             Medications:  Current Outpatient Medications   Medication     norethindrone-ethinyl estradiol (JUNEL FE 1/20) 1-20 MG-MCG tablet     norgestrel-ethinyl estradiol (LO/OVRAL) 0.3-30 MG-MCG tablet     No current facility-administered medications for this visit.        Past Medical History:   There is no problem list on file for this patient.    No past medical history on file.    CC Referred Self, MD  No address on file on close of this encounter.

## 2023-02-23 NOTE — LETTER
Date:March 20, 2023      Provider requested that no letter be sent. Do not send.       [facility]

## 2023-02-23 NOTE — LETTER
2/23/2023       RE: Twyla Mayen  [address]   [place] [address]     Dear Colleague,    Thank you for referring your patient, Twyla Mayen, to the [facility] [place] at [facility]. Please see a copy of my visit note below.    [facility] Dermatology Note  Encounter Date: Feb 23, 2023  Office Visit     Dermatology Problem List:  # Cherry angioma vs traumatized nevus, L upper arm  - Photos 2/23/23.     ____________________________________________    Assessment & Plan:   # Cherry angioma vs traumatized nevus, L upper arm  - Discussed benign appearance of the above lesion. Does not require any treatment today.   - Recommended recheck in 6-12 months to ensure not growing or changing.   - Photos today.     # Multiple benign nevi, arms  - Reassured benign etiology. No treatment required.   - Discussed signs and symptoms of skin cancers and ABCDEs of melanoma.      Procedures Performed:   None    Follow-up: 6-12 months for recheck of L upper arm lesion.     Staff and Resident:     Resident:  I saw and discussed the patient with the attending physician, Dr. Cody Meza MD, PhD  PGY-2 Dermatology Resident     I have seen and examined this patient and agree with the assessment and plan as documented in the resident's note.    Brandon Ross MD  Dermatology Attending    ____________________________________________    CC: changing mole on arm    HPI:  Ms. Twyla Mayen is a(n) 22 year old female who presents today as a new patient for lesion of concern on L upper arm:    - Reports changing mole on L upper arm. Thinks it used to be brown like her other mole but in the past week has turned red. She does not think she's scratched it. It is not painful or bleeding. Has a few other moles on her arms she would like to have checked as well. Does not desire full skin check today.  - No personal history of  skin cancer. Does not recall anyone in the family with melanoma.  - Generally wears sunscreen. No history of tanning bed use. A few bad sunburns in the past.       Labs Reviewed:  N/A    Physical Exam:  Vitals: There were no vitals taken for this visit.  SKIN: Focused examination of arms was performed.  - L upper arm with ~2-3mm pink to bright red papule, somewhat vascular appearance on dermoscopy.   - Uniform-appearing, light brown macules and papules on the arms with benign dermatoscopic appearance.             Medications:  Current Outpatient Medications   Medication     norethindrone-ethinyl estradiol (JUNEL FE 1/20) 1-20 MG-MCG tablet     norgestrel-ethinyl estradiol (LO/OVRAL) 0.3-30 MG-MCG tablet     No current facility-administered medications for this visit.        Past Medical History:   There is no problem list on file for this patient.    No past medical history on file.    CC Referred Self, MD  No address on file on close of this encounter.          Again, thank you for allowing me to participate in the care of your patient.      Sincerely,    Brandon Ross MD

## 2023-03-14 ENCOUNTER — PATIENT OUTREACH (OUTPATIENT)
Dept: DERMATOLOGY | Facility: CLINIC | Age: 23
End: 2023-03-14
Payer: COMMERCIAL

## 2023-03-14 NOTE — TELEPHONE ENCOUNTER
Attempted to reach patient to schedule follow up in the Dermatology Clinic.  No answer,  LM on VM to call office and Tweetwall message sent..    Schedule with Dr. Brandon Ross 8/2023.

## 2023-03-18 PROBLEM — D22.9 MULTIPLE BENIGN NEVI: Status: ACTIVE | Noted: 2023-03-18

## 2023-03-18 PROBLEM — D48.5 NEOPLASM OF UNCERTAIN BEHAVIOR OF SKIN: Status: ACTIVE | Noted: 2023-03-18

## 2023-10-07 ENCOUNTER — HEALTH MAINTENANCE LETTER (OUTPATIENT)
Age: 23
End: 2023-10-07

## 2024-02-07 ENCOUNTER — ALLIED HEALTH/NURSE VISIT (OUTPATIENT)
Dept: INTERNAL MEDICINE | Facility: CLINIC | Age: 24
End: 2024-02-07
Payer: COMMERCIAL

## 2024-02-07 DIAGNOSIS — Z11.1 SCREENING EXAMINATION FOR PULMONARY TUBERCULOSIS: Primary | ICD-10-CM

## 2024-02-07 PROCEDURE — 86580 TB INTRADERMAL TEST: CPT

## 2024-02-07 PROCEDURE — 99207 PR NO CHARGE NURSE ONLY: CPT

## 2024-02-07 RX ORDER — NORETHINDRONE ACETATE AND ETHINYL ESTRADIOL, ETHINYL ESTRADIOL AND FERROUS FUMARATE 1MG-10(24)
KIT ORAL SEE ADMIN INSTRUCTIONS
COMMUNITY
Start: 2023-12-12

## 2024-02-07 NOTE — PROGRESS NOTES
Patient came to clinic to receive TB skin test, per pt. Patient was given Mantoux test with no complications and told to return to clinic in two days for evaluation of injection site.     Melissa Britt, EMT at 11:11 AM on 2/7/2024

## 2024-02-09 ENCOUNTER — ALLIED HEALTH/NURSE VISIT (OUTPATIENT)
Dept: INTERNAL MEDICINE | Facility: CLINIC | Age: 24
End: 2024-02-09
Payer: COMMERCIAL

## 2024-02-09 DIAGNOSIS — Z11.1 SCREENING EXAMINATION FOR PULMONARY TUBERCULOSIS: Primary | ICD-10-CM

## 2024-02-09 PROCEDURE — 99207 PR NO CHARGE NURSE ONLY: CPT

## 2024-02-09 NOTE — PROGRESS NOTES
Twyla was seen today for a nurse visit for follow up on TB test. Injection site visible but no signs of inflammation or induration. Tuberculin test is negative.     Edmundo Jewell RN on 2/9/2024 at 1:26 PM

## 2024-02-09 NOTE — PROGRESS NOTES
Patient came to clinic for TB reading, two days post injection. Injection site was observed and confirmed to be negative by Zach Mendell, RN.     Melissa Britt, EMT at 1:23 PM on 2/9/2024

## 2024-02-14 ENCOUNTER — ALLIED HEALTH/NURSE VISIT (OUTPATIENT)
Dept: INTERNAL MEDICINE | Facility: CLINIC | Age: 24
End: 2024-02-14
Payer: COMMERCIAL

## 2024-02-14 DIAGNOSIS — Z11.1 SCREENING EXAMINATION FOR PULMONARY TUBERCULOSIS: Primary | ICD-10-CM

## 2024-02-14 PROCEDURE — 86580 TB INTRADERMAL TEST: CPT

## 2024-02-14 PROCEDURE — 99207 PR NO CHARGE NURSE ONLY: CPT

## 2024-02-14 NOTE — PROGRESS NOTES
Patient came to clinic for a second TB skin test per pt. The area was cleaned with an alcohol swab and the injection was completed without incident. The patient was informed to return to clinic in two days to have the skin test read. The patient is scheduled to return on 02/16/24.     ELLI Birch at 12:35 PM on 2/14/2024

## 2024-02-16 ENCOUNTER — ALLIED HEALTH/NURSE VISIT (OUTPATIENT)
Dept: INTERNAL MEDICINE | Facility: CLINIC | Age: 24
End: 2024-02-16
Payer: COMMERCIAL

## 2024-02-16 DIAGNOSIS — Z11.1 SCREENING EXAMINATION FOR PULMONARY TUBERCULOSIS: Primary | ICD-10-CM

## 2024-02-16 PROCEDURE — 99207 PR NO CHARGE NURSE ONLY: CPT

## 2024-02-16 NOTE — PROGRESS NOTES
Patient is here today for a Mantoux (TST) test results.    Did patient return to clinic 48-72 hours from Mantoux (TST) placement: Yes -     Induration Size? Induration <5mm - Enter results in Enter/Edit Activity. Route results to ordering provider.     Patient needs form signed? No    Does patient need a two step? Patient presented to the clinic today for 2nd reading.     Daniela Ren RN on 2/16/2024 at 9:45 AM

## 2024-06-10 ENCOUNTER — ALLIED HEALTH/NURSE VISIT (OUTPATIENT)
Dept: INTERNAL MEDICINE | Facility: CLINIC | Age: 24
End: 2024-06-10
Payer: COMMERCIAL

## 2024-06-10 DIAGNOSIS — R05.9 COUGH, UNSPECIFIED TYPE: Primary | ICD-10-CM

## 2024-06-10 LAB
FLUAV RNA SPEC QL NAA+PROBE: NEGATIVE
FLUBV RNA RESP QL NAA+PROBE: NEGATIVE
RSV RNA SPEC NAA+PROBE: NEGATIVE
SARS-COV-2 RNA RESP QL NAA+PROBE: NEGATIVE

## 2024-06-10 PROCEDURE — 99207 PR NO CHARGE NURSE ONLY: CPT

## 2024-06-10 PROCEDURE — 87637 SARSCOV2&INF A&B&RSV AMP PRB: CPT | Performed by: INTERNAL MEDICINE

## 2024-06-10 PROCEDURE — 99000 SPECIMEN HANDLING OFFICE-LAB: CPT | Performed by: PATHOLOGY

## 2024-06-10 NOTE — PROGRESS NOTES
Patient presented in clinic for a covid-19 swab, performed a Covid-19/influenza/RSV swab. Pt tolerated swab well. Dr. Coon was present in clinic if needed.     Melissa Britt, EMT at 1:28 PM on 6/10/2024

## 2024-09-12 ENCOUNTER — ALLIED HEALTH/NURSE VISIT (OUTPATIENT)
Dept: INTERNAL MEDICINE | Facility: CLINIC | Age: 24
End: 2024-09-12
Payer: COMMERCIAL

## 2024-09-12 DIAGNOSIS — R11.0 NAUSEA: Primary | ICD-10-CM

## 2024-09-12 PROCEDURE — 87637 SARSCOV2&INF A&B&RSV AMP PRB: CPT | Performed by: INTERNAL MEDICINE

## 2024-09-12 PROCEDURE — 99000 SPECIMEN HANDLING OFFICE-LAB: CPT | Performed by: PATHOLOGY

## 2024-09-12 PROCEDURE — 99207 PR NO CHARGE NURSE ONLY: CPT

## 2024-09-12 NOTE — PROGRESS NOTES
Patient came to clinic for a swab for possible viral infection. Pt was tested under the supervision of Dr. Goff.     ELLI Birch at 2:20 PM on 9/12/2024       room air

## 2024-10-19 ASSESSMENT — SLEEP AND FATIGUE QUESTIONNAIRES
HOW LIKELY ARE YOU TO NOD OFF OR FALL ASLEEP WHILE WATCHING TV: SLIGHT CHANCE OF DOZING
HOW LIKELY ARE YOU TO NOD OFF OR FALL ASLEEP WHILE SITTING INACTIVE IN A PUBLIC PLACE: MODERATE CHANCE OF DOZING
HOW LIKELY ARE YOU TO NOD OFF OR FALL ASLEEP IN A CAR, WHILE STOPPED FOR A FEW MINUTES IN TRAFFIC: MODERATE CHANCE OF DOZING
HOW LIKELY ARE YOU TO NOD OFF OR FALL ASLEEP WHEN YOU ARE A PASSENGER IN A CAR FOR AN HOUR WITHOUT A BREAK: HIGH CHANCE OF DOZING
HOW LIKELY ARE YOU TO NOD OFF OR FALL ASLEEP WHILE SITTING AND TALKING TO SOMEONE: SLIGHT CHANCE OF DOZING
HOW LIKELY ARE YOU TO NOD OFF OR FALL ASLEEP WHILE LYING DOWN TO REST IN THE AFTERNOON WHEN CIRCUMSTANCES PERMIT: HIGH CHANCE OF DOZING
HOW LIKELY ARE YOU TO NOD OFF OR FALL ASLEEP WHILE SITTING QUIETLY AFTER LUNCH WITHOUT ALCOHOL: HIGH CHANCE OF DOZING
HOW LIKELY ARE YOU TO NOD OFF OR FALL ASLEEP WHILE SITTING AND READING: HIGH CHANCE OF DOZING

## 2024-10-22 ENCOUNTER — VIRTUAL VISIT (OUTPATIENT)
Dept: SLEEP MEDICINE | Facility: CLINIC | Age: 24
End: 2024-10-22
Payer: COMMERCIAL

## 2024-10-22 VITALS — WEIGHT: 210 LBS | HEIGHT: 67 IN | BODY MASS INDEX: 32.96 KG/M2

## 2024-10-22 DIAGNOSIS — G47.19 EXCESSIVE DAYTIME SLEEPINESS: ICD-10-CM

## 2024-10-22 DIAGNOSIS — G47.10 ORGANIC HYPERSOMNIA: Primary | ICD-10-CM

## 2024-10-22 DIAGNOSIS — R06.89 DYSPNEA AND RESPIRATORY ABNORMALITY: ICD-10-CM

## 2024-10-22 DIAGNOSIS — E66.09 CLASS 1 OBESITY DUE TO EXCESS CALORIES WITH BODY MASS INDEX (BMI) OF 32.0 TO 32.9 IN ADULT, UNSPECIFIED WHETHER SERIOUS COMORBIDITY PRESENT: ICD-10-CM

## 2024-10-22 DIAGNOSIS — E66.811 CLASS 1 OBESITY DUE TO EXCESS CALORIES WITH BODY MASS INDEX (BMI) OF 32.0 TO 32.9 IN ADULT, UNSPECIFIED WHETHER SERIOUS COMORBIDITY PRESENT: ICD-10-CM

## 2024-10-22 DIAGNOSIS — R06.00 DYSPNEA AND RESPIRATORY ABNORMALITY: ICD-10-CM

## 2024-10-22 DIAGNOSIS — Z72.820 LACK OF ADEQUATE SLEEP: ICD-10-CM

## 2024-10-22 PROCEDURE — 99204 OFFICE O/P NEW MOD 45 MIN: CPT | Mod: 95 | Performed by: PHYSICIAN ASSISTANT

## 2024-10-22 ASSESSMENT — PAIN SCALES - GENERAL: PAINLEVEL: NO PAIN (0)

## 2024-10-22 NOTE — NURSING NOTE
Current patient location:  work     Is the patient currently in the state [facility]? YES    Visit mode:VIDEO    If the visit is dropped, the patient can be reconnected by: VIDEO VISIT: Text to cell phone:   Telephone Information:   Mobile [phone]       Will anyone else be joining the visit? NO  (If patient encounters technical issues they should call [phone]    Are changes needed to the allergy or medication list? Pt stated no med changes    Are refills needed on medications prescribed by this physician? NO    Rooming Documentation:  Questionnaire(s) completed    Reason for visit: Consult and Sleep Apnea    Farhana CESPEDESF

## 2024-10-22 NOTE — PATIENT INSTRUCTIONS
"          MY TREATMENT INFORMATION FOR SLEEP APNEA-  Twyla Liconaisamar    DOCTOR : Dony Richey PA    Am I having a sleep study at a sleep center?  --->Due to normal delays, you will be contacted within 2-4 weeks to schedule    Am I having a home sleep study?  --->Watch the video for the device you are using:    -/drop off device-   https://www.HotClickVideoube.com/watch?v=yGGFBdELGhk    -Disposable device sent out require phone/computer application-   https://www."[employer].com/watch?v=BCce_vbiwxE      Frequently asked questions:  1. What is Obstructive Sleep Apnea (SRIDEVI)? SRIDEVI is the most common type of sleep apnea. Apnea means, \"without breath.\"  Apnea is most often caused by narrowing or collapse of the upper airway as muscles relax during sleep.   Almost everyone has occasional apneas. Most people with sleep apnea have had brief interruptions at night frequently for many years.  The severity of sleep apnea is related to how frequent and severe the events are.   2. What are the consequences of SRIDEVI? Symptoms include: feeling sleepy during the day, snoring loudly, gasping or stopping of breathing, trouble sleeping, and occasionally morning headaches or heartburn at night.  Sleepiness can be serious and even increase the risk of falling asleep while driving. Other health consequences may include development of high blood pressure and other cardiovascular disease in persons who are susceptible. Untreated SRIDEVI  can contribute to heart disease, stroke and diabetes.   3. What are the treatment options? In most situations, sleep apnea is a lifelong disease that must be managed with daily therapy. Medications are not effective for sleep apnea and surgery is generally not considered until other therapies have been tried. Your treatment is your choice . Continuous Positive Airway (CPAP) works right away and is the therapy that is effective in nearly everyone. An oral device to hold your jaw forward is usually the next most " reliable option. Other options include postioning devices (to keep you off your back), weight loss, and surgery including a tongue pacing device. There is more detail about some of these options below.  4. Are my sleep studies covered by insurance? Although we will request verification of coverage, we advise you also check in advance of the study to ensure there is coverage.    Important tips for those choosing CPAP and similar devices  REMEMBER-IF YOU RECEIVE A CALL FROM  [phone]-->IT IS TO SETUP A DEVICE  For new devices, sign up for device JUAN to monitor your device for your followup visits  We encourage you to utilize the [employer] juan or website ( https://Social & Loyal.[employer]/ ) to monitor your therapy progress and share the data with your healthcare team when you discuss your sleep apnea.                                                    Know your equipment:  CPAP is continuous positive airway pressure that prevents obstructive sleep apnea by keeping the throat from collapsing while you are sleeping. In most cases, the device is  smart  and can slowly self-adjusts if your throat collapses and keeps a record every day of how well you are treated-this information is available to you and your care team.  BPAP is bilevel positive airway pressure that keeps your throat open and also assists each breath with a pressure boost to maintain adequate breathing.  Special kinds of BPAP are used in patients who have inadequate breathing from lung or heart disease. In most cases, the device is  smart  and can slowly self-adjusts to assist breathing. Like CPAP, the device keeps a record of how well you are treated.  Your mask is your connection to the device. You get to choose what feels most comfortable and the staff will help to make sure if fits. Here: are some examples of the different masks that are available: Magnetic mask aids may assist with use but there are safety issues that should be addressed when  considering with magnets* ( see end of discussion).       Key points to remember on your journey with sleep apnea:  Sleep study.  PAP devices often need to be adjusted during a sleep study to show that they are effective and adjusted right.  Good tips to remember: Try wearing just the mask during a quiet time during the day so your body adapts to wearing it. A humidifier is recommended for comfort in most cases to prevent drying of your nose and throat. Allergy medication from your provider may help you if you are having nasal congestion.  Getting settled-in. It takes more than one night for most of us to get used to wearing a mask. Try wearing just the mask during a quiet time during the day so your body adapts to wearing it. A humidifier is recommended for comfort in most cases. Our team will work with you carefully on the first day and will be in contact within 4 days and again at 2 and 4 weeks for advice and remote device adjustments. Your therapy is evaluated by the device each day.   Use it every night. The more you are able to sleep naturally for 7-8 hours, the more likely you will have good sleep and to prevent health risks or symptoms from sleep apnea. Even if you use it 4 hours it helps. Occasionally all of us are unable to use a medical therapy, in sleep apnea, it is not dangerous to miss one night.   Communicate. Call our skilled team on the number provided on the first day if your visit for problems that make it difficult to wear the device. Over 2 out of 3 patients can learn to wear the device long-term with help from our team. Remember to call our team or your sleep providers if you are unable to wear the device as we may have other solutions for those who cannot adapt to mask CPAP therapy. It is recommended that you sleep your sleep provider within the first 3 months and yearly after that if you are not having problems.   Use it for your health. We encourage use of CPAP masks during daytime quiet  periods to allow your face and brain to adapt to the sensation of CPAP so that it will be a more natural sensation to awaken to at night or during naps. This can be very useful during the first few weeks or months of adapting to CPAP though it does not help medically to wear CPAP during wakefulness and  should not be used as a strategy just to meet guidelines.  Take care of your equipment. Make sure you clean your mask and tubing using directions every day and that your filter and mask are replaced as recommended or if they are not working.     *Masks with magnets:  Updated Contraindications  Masks with magnetic components are contraindicated for use by patients where they, or anyone in close physical contact while using the mask, have the following:   Active medical implants that interact with magnets (i.e., pacemakers, implantable cardioverter defibrillators (ICD), neurostimulators, cerebrospinal fluid (CSF) shunts, insulin/infusion pumps)   Metallic implants/objects containing ferromagnetic material (i.e., aneurysm clips/flow disruption devices, embolic coils, stents, valves, electrodes, implants to restore hearing or balance with implanted magnets, ocular implants, metallic splinters in the eye)  Updated Warning  Keep the mask magnets at a safe distance of at least 6 inches (150 mm) away from implants or medical devices that may be adversely affected by magnetic interference. This warning applies to you or anyone in close physical contact with your mask. The magnets are in the frame and lower headgear clips, with a magnetic field strength of up to 400mT. When worn, they connect to secure the mask but may inadvertently detach while asleep.  Implants/medical devices, including those listed within contraindications, may be adversely affected if they change function under external magnetic fields or contain ferromagnetic materials that attract/repel to magnetic fields (some metallic implants, e.g., contact lenses  with metal, dental implants, metallic cranial plates, screws, charlotte hole covers, and bone substitute devices). Consult your physician and  of your implant / other medical device for information on the potential adverse effects of magnetic fields.    BESIDES CPAP, WHAT OTHER THERAPIES ARE THERE?    Positioning Device  Positioning devices are generally used when sleep apnea is mild and only occurs on your back.This example shows a pillow that straps around the waist. It may be appropriate for those whose sleep study shows milder sleep apnea that occurs primarily when lying flat on one's back. Preliminary studies have shown benefit but effectiveness at home may need to be verified by a home sleep test. These devices are generally not covered by medical insurance.  Examples of devices that maintain sleeping on the back to prevent snoring and mild sleep apnea.    Belt type body positioner  http://You[employer] Beauty/    Electronic reminder  http://nightshifttherapy.BitGym/            Oral Appliance  What is oral appliance therapy?  An oral appliance device fits on your teeth at night like a retainer used after having braces. The device is made by a specialized dentist and requires several visits over 1-2 months before a manufactured device is made to fit your teeth and is adjusted to prevent your sleep apnea. Once an oral device is working properly, snoring should be improved. A home sleep test may be recommended at that time if to determine whether the sleep apnea is adequately treated.       Some things to remember:  -Oral devices are often, but not always, covered by your medical insurance. Be sure to check with your insurance provider.   -If you are referred for oral therapy, you will be given a list of specialized dentists to consider or you may choose to visit the Web site of the American Academy of Dental Sleep Medicine  -Oral devices are less likely to work if you have severe sleep apnea or are extremely  overweight.     More detailed information  An oral appliance is a small acrylic device that fits over the upper and lower teeth  (similar to a retainer or a mouth guard). This device slightly moves jaw forward, which moves the base of the tongue forward, opens the airway, improves breathing for effective treat snoring and obstructive sleep apnea in perhaps 7 out of 10 people .  The best working devices are custom-made by a dental device  after a mold is made of the teeth 1, 2, 3.  When is an oral appliance indicated?  Oral appliance therapy is recommended as a first-line treatment for patients with primary snoring, mild sleep apnea, and for patients with moderate sleep apnea who prefer appliance therapy to use of CPAP4, 5. Severity of sleep apnea is determined by sleep testing and is based on the number of respiratory events per hour of sleep.   How successful is oral appliance therapy?  The success rate of oral appliance therapy in patients with mild sleep apnea is 75-80% while in patients with moderate sleep apnea it is 50-70%. The chance of success in patients with severe sleep apnea is 40-50%. The research also shows that oral appliances have a beneficial effect on the cardiovascular health of SRIDEVI patients at the same magnitude as CPAP therapy7.  Oral appliances should be a second-line treatment in cases of severe sleep apnea, but if not completely successful then a combination therapy utilizing CPAP plus oral appliance therapy may be effective. Oral appliances tend to be effective in a broad range of patients although studies show that the patients who have the highest success are females, younger patients, those with milder disease, and less severe obesity. 3, 6.   Finding a dentist that practices dental sleep medicine  Specific training is available through the American Academy of Dental Sleep Medicine for dentists interested in working in the field of sleep. To find a dentist who is educated in  the field of sleep and the use of oral appliances, near you, visit the Web site of the American Academy of Dental Sleep Medicine.    References  1. Leyla, et al. Objectively measured vs self-reported compliance during oral appliance therapy for sleep-disordered breathing. Chest 2013; 144(5): 8769-0704.  2. Jose et al. Objective measurement of compliance during oral appliance therapy for sleep-disordered breathing. Thorax 2013; 68(1): 91-96.  3. Lauro et al. Mandibular advancement devices in 620 men and women with SRIDEVI and snoring: tolerability and predictors of treatment success. Chest 2004; 125: 3339-9766.  4. Jovan, et al. Oral appliances for snoring and SRIDEVI: a review. Sleep 2006; 29: 244-262.  5. Charlotte et al. Oral appliance treatment for SRIDEVI: an update. J Clin Sleep Med 2014; 10(2): 215-227.  6. Lenka et al. Predictors of OSAH treatment outcome. J Dent Res 2007; 86: 7727-8028.      Weight Loss:   Your Body mass index is 32.89 kg/m .    Being overweight does not necessarily mean you will have health consequences.  Those who have BMI over 35 or over 27 with existing medical conditions carries greater risk.   Weight loss decreases severity of sleep apnea in most people with obesity. For those with mild obesity who have developed snoring with weight gain, even 15-30 pound weight loss can improve and occasionally milder eliminate sleep apnea.  Structured and life-long dietary and health habits are necessary to lose weight and keep healthier weight levels.     The Comprehensive Weight loss program offers all aspects of weight loss strategies including two Non-Surgical Weight Loss Programs: Medical Weight Management and our 24 Week Healthy Lifestyle Program:    Medical Weight Management: You will meet with a Medical Weight Management Provider, as well as a Registered Dietician. The program may include medication therapy, dietary education, recommended exercise and physical therapy programs,  monthly support group meetings, and possible psychological counseling. Follow up visits with the provider or dietician are scheduled based on your progress and needs.    24 Week Healthy Lifestyle Program: This unique program is designed to give you the support of weekly appointments and activities thru a 24-week period. It may include all of the components of the basic program (above), with the addition of 11 individual Health  Visits, 24-week access to the [employer] website for over 700 online classes, and monthly support group meetings. This program has an out-of-pocket expense of $499 to cover the items that can not be billed to insurance (health coaches and [employer] access), and is non-refundable/non-transferable (you may be able to use a Health Savings Account; ask your HSA provider). There may be an optional meal replacement plan prescribed as well.   Surgical management achieves meaningful long-term weight loss and improvement in health risks in most patients with more severe obesity.      Sleep Apnea Surgery:    Surgery for obstructive sleep apnea is considered generally only when other therapies fail to work. Surgery may be discussed with you if you are having a difficult time tolerating CPAP and or when there is an abnormal structure that requires surgical correction.  Nose and throat surgeries often enlarge the airway to prevent collapse.  Most of these surgeries create pain for 1-2 weeks and up to half of the most common surgeries are not effective throughout life.  You should carefully discuss the benefits and drawbacks to surgery with your sleep provider and surgeon to determine if it is the best solution for you.   More information  Surgery for SRIDEVI is directed at areas that are responsible for narrowing or complete obstruction of the airway during sleep.  There are a wide range of procedures available to enlarge and/or stabilize the airway to prevent blockage of breathing in the three major  areas where it can occur: the palate, tongue, and nasal regions.  Successful surgical treatment depends on the accurate identification of the factors responsible for obstructive sleep apnea in each person.  A personalized approach is required because there is no single treatment that works well for everyone.  Because of anatomic variation, consultation with an examination by a sleep surgeon is a critical first step in determining what surgical options are best for each patient.  In some cases, examination during sedation may be recommended in order to guide the selection of procedures.  Patients will be counseled about risks and benefits as well as the typical recovery course after surgery. Surgery is typically not a cure for a person s SRIDEVI.  However, surgery will often significantly improve one s SRIDEVI severity (termed  success rate ).  Even in the absence of a cure, surgery will decrease the cardiovascular risk associated with OSA7; improve overall quality of life8 (sleepiness, functionality, sleep quality, etc).      Palate Procedures:  Patients with SRIDEVI often have narrowing of their airway in the region of their tonsils and uvula.  The goals of palate procedures are to widen the airway in this region as well as to help the tissues resist collapse.  Modern palate procedure techniques focus on tissue conservation and soft tissue rearrangement, rather than tissue removal.  Often the uvula is preserved in this procedure. Residual sleep apnea is common in patient after pharyngoplasty with an average reduction in sleep apnea events of 33%2.      Tongue Procedures:  ExamWhile patients are awake, the muscles that surround the throat are active and keep this region open for breathing. These muscles relax during sleep, allowing the tongue and other structures to collapse and block breathing.  There are several different tongue procedures available.  Selection of a tongue base procedure depends on characteristics seen on  physical exam.  Generally, procedures are aimed at removing bulky tissues in this area or preventing the back of the tongue from falling back during sleep.  Success rates for tongue surgery range from 50-62%3.    Hypoglossal Nerve Stimulation:  Hypoglossal nerve stimulation has recently received approval from the [place] Food and Drug Administration for the treatment of obstructive sleep apnea.  This is based on research showing that the system was safe and effective in treating sleep apnea6.  Results showed that the median AHI score decreased 68%, from 29.3 to 9.0. This therapy uses an implant system that senses breathing patterns and delivers mild stimulation to airway muscles, which keeps the airway open during sleep.  The system consists of three fully implanted components: a small generator (similar in size to a pacemaker), a breathing sensor, and a stimulation lead.  Using a small handheld remote, a patient turns the therapy on before bed and off upon awakening.    Candidates for this device must be greater than 18 years of age, have moderate to severe obstructive sleep apnea with less than 25% central events  (AHI between 15-65), BMI less than 35, have tried CPAP/oral appliance for at least 8 weeks without success, and have appropriate upper airway anatomy (determined by a sleep endoscopy performed by Dr. Cl Mendez or Dr. Dk Shah).    Nasal Procedures:  Nasal obstruction can interfere with nasal breathing during the day and night.  Studies have shown that relief of nasal obstruction can improve the ability of some patients to tolerate positive airway pressure therapy for obstructive sleep apnea1.  Treatment options include medications such as nasal saline, topical corticosteroid and antihistamine sprays, and oral medications such as antihistamines or decongestants. Non-surgical treatments can include external nasal dilators for selected patients. If these are not successful by themselves,  surgery can improve the nasal airway either alone or in combination with these other options.        Combination Procedures:  Combination of surgical procedures and other treatments may be recommended, particularly if patients have more than one area of narrowing or persistent positional disease.  The success rate of combination surgery ranges from 66-80%2,3.    References  Roderick WATT. The Role of the Nose in Snoring and Obstructive Sleep Apnoea: An Update.  Eur Arch Otorhinolaryngol. 2011; 268: 1365-73.   Jeff SM; Arcelia JA; Christiano JR; Pallanch JF; Yas MB; Juan SG; Mariah BORREGO. Surgical modifications of the upper airway for obstructive sleep apnea in adults: a systematic review and meta-analysis. SLEEP 2010;33(10):9602-4051. Dorita CELESTIN. Hypopharyngeal surgery in obstructive sleep apnea: an evidence-based medicine review.  Arch Otolaryngol Head Neck Surg. 2006 Feb;132(2):206-13.  Joseph YH1, Fe Y, Zeyad VAN. The efficacy of anatomically based multilevel surgery for obstructive sleep apnea. Otolaryngol Head Neck Surg. 2003 Oct;129(4):327-35.  Dorita CELESTIN, Goldberg A. Hypopharyngeal Surgery in Obstructive Sleep Apnea: An Evidence-Based Medicine Review. Arch Otolaryngol Head Neck Surg. 2006 Feb;132(2):206-13.  Kristi POOLE et al. Upper-Airway Stimulation for Obstructive Sleep Apnea.  N Engl J Med. 2014 Jan 9;370(2):139-49.  Kaylin Y et al. Increased Incidence of Cardiovascular Disease in Middle-aged Men with Obstructive Sleep Apnea. Am J Respir Crit Care Med; 2002 166: 159-165  Mccullough EM et al. Studying Life Effects and Effectiveness of Palatopharyngoplasty (SLEEP) study: Subjective Outcomes of Isolated Uvulopalatopharyngoplasty. Otolaryngol Head Neck Surg. 2011; 144: 623-631.        WHAT IF I ONLY HAVE SNORING?    Mandibular advancement devices, lateral sleep positioning, long-term weight loss and treatment of nasal allergies have been shown to improve snoring.  Exercising tongue muscles with a game  (https://Gradematic.com.Freenom.[employer]ty [employer]/us/juan/soundly-reduce-snoring/it9239826253) or stimulating the tongue during the day with a device (https://doi.org/10.3390/ezb77060068) have improved snoring in some individuals.  https://www.[employer]il [employer]/  https://www.sleepfoundation.org/best-anti-snoring-mouthpieces-and-mouthguards    Remember to Drive Safe... Drive Alive     Sleep health profoundly affects your health, mood, and your safety.  Thirty three percent of the population (one in three of us) is not getting enough sleep and many have a sleep disorder. Not getting enough sleep or having an untreated / undertreated sleep condition may make us sleepy without even knowing it. In fact, our driving could be dramatically impaired due to our sleep health. As your provider, here are some things I would like you to know about driving:     Here are some warning signs for impairment and dangerous drowsy driving:              -Having been awake more than 16 hours               -Looking tired               -Eyelid drooping              -Head nodding (it could be too late at this point)              -Driving for more than 30 minutes     Some things you could do to make the driving safer if you are experiencing some drowsiness:              -Stop driving and rest              -Call for transportation              -Make sure your sleep disorder is adequately treated     Some things that have been shown NOT to work when experiencing drowsiness while driving:              -Turning on the radio              -Opening windows              -Eating any  distracting  /  entertaining  foods (e.g., sunflower seeds, candy, or any other)              -Talking on the phone      Your decision may not only impact your life, but also the life of others. Please, remember to drive safe for yourself and all of us.           Your Body mass index is 32.89 kg/m .  Weight management is a personal decision.  If you are interested in exploring weight loss strategies,  the following discussion covers the approaches that may be successful. Body mass index (BMI) is one way to tell whether you are at a healthy weight, overweight, or obese. It measures your weight in relation to your height.  A BMI of 18.5 to 24.9 is in the healthy range. A person with a BMI of 25 to 29.9 is considered overweight, and someone with a BMI of 30 or greater is considered obese. More than two-thirds of American adults are considered overweight or obese.  Being overweight or obese increases the risk for further weight gain. Excess weight may lead to heart disease and diabetes.  Creating and following plans for healthy eating and physical activity may help you improve your health.  Weight control is part of healthy lifestyle and includes exercise, emotional health, and healthy eating habits. Careful eating habits lifelong are the mainstay of weight control. Though there are significant health benefits from weight loss, long-term weight loss with diet alone may be very difficult to achieve- studies show long-term success with dietary management in less than 10% of people. Attaining a healthy weight may be especially difficult to achieve in those with severe obesity. In some cases, medications, devices and surgical management might be considered.  What can you do?  If you are overweight or obese and are interested in methods for weight loss, you should discuss this with your provider.   Consider reducing daily calorie intake by 500 calories.   Keep a food journal.   Avoiding skipping meals, consider cutting portions instead.    Diet combined with exercise helps maintain muscle while optimizing fat loss. Strength training is particularly important for building and maintaining muscle mass. Exercise helps reduce stress, increase energy, and improves fitness. Increasing exercise without diet control, however, may not burn enough calories to loose weight.     Start walking three days a week 10-20 minutes at a  time  Work towards walking thirty minutes five days a week   Eventually, increase the speed of your walking for 1-2 minutes at time    In addition, we recommend that you review healthy lifestyles and methods for weight loss available through the National Institutes of Health patient information sites:  http://win.niddk.nih.gov/publications/index.htm    And look into health and wellness programs that may be available through your health insurance provider, employer, local community center, or meagan club.

## 2024-10-22 NOTE — PROGRESS NOTES
Video-Visit Details    Type of service:  Video Visit    Video Visit Start Time:3:25 PM    Video End Time:4:02 PM    Originating Location (pt. Location): Home      Distant Location (provider location):  On-site     Platform used for Video Visit: [facility]    Outpatient Sleep Medicine Consultation:      Name: Twyla Mayen MRN# [id number]   Age: 23 year old YOB: 2000     Date of Consultation: October 22, 2024  Consultation is requested by: Referred Self, MD  No address on file Referred Self  Primary care provider: No Ref-Primary, Physician       Reason for Sleep Consult:       Patient s Reason for visit  Twyla Mayen main reason for visit: Chronic day time fatigue and waking up feeling tired  Patient states problem(s) started: Been a problem for over 10 years  Twyla Mayen's goals for this visit: Discuss in lab sleep study and potential treatment for day time fatigue           Assessment and Plan:     Summary Sleep Diagnoses & Recommendations:   Excessive daytime sleepiness without features of narcolepsy-.    Differential diagnosis:  - inadequate total sleep time: Reports 7-8 hours of sleep nightly   -sleep disruption/obstructive sleep apnea, possible with snoring, obesity, bruxism, excessive daytime sleepiness (ESS 18). The STOP-BANG score is 2/8.   -medications (None)  -circadian misalignment likely partially related, we discussed avoiding sleeping in on weekends.   -narcolepsy/ idiopathic hypersomnolence possible.    We discussed options.   Decided on hypersomnia work up with Actigraphy x2 weeks followed by polysomnogram/Multible Sleep Latency Test and urine drug screen.     The patient is instructed to follow up with me after the results of PSG/MSLT are available to discuss the results and treatment options.      Summary Recommendations:  Orders Placed This Encounter   Procedures    Comprehensive Sleep Study    Drug abuse screen 77 urine ([place] only)       Summary  Counseling:    Sleep Testing Reviewed  Obstructive Sleep Apnea Reviewed  Complications of Untreated Sleep Apnea Reviewed      Medical Decision-making:   Educational materials provided in instructions    Total time spent reviewing medical records, history and physical examination, review of previous testing and interpretation as well as documentation on this date:50 minutes    CC: Referred Self          History of Present Illness:     Past Sleep Evaluations:  Home sleep study in August of 2024 was negative. We don't have records as it was conducted at [place]    She denies illicit drug use.     SLEEP-WAKE SCHEDULE:     Work/School Days: Patient goes to school/work: Yes   Usually gets into bed at 11pm  Takes patient about Less than 5 minutes to fall asleep  Has trouble falling asleep Rarely less than 1 per week nights per week  Wakes up in the middle of the night 0 times.  Wakes up due to Other  She has trouble falling back asleep Rarely if I wake up to use the bathroom I will fall asleep almost instantly times a week.   It usually takes Less than 5 minutes to get back to sleep  Patient is usually up at 7 am  Uses alarm: Yes    Weekends/Non-work Days/All Other Days:  Usually gets into bed at 12-1   Takes patient about Less than 5 minutes to fall asleep  Patient is usually up at Depends how late I stay up usually try to give myself at least 7-8 hours  Uses alarm: Yes    Sleep Need  Patient gets  7-8 hours sleep on average   Patient thinks she needs about I feel like I feel more rested when I get less sleep so maybe 7 hours sleep    Twyla Mayen prefers to sleep in this position(s): Side;Stomach   Patient states they do the following activities in bed: Use phone, computer, or tablet;Work    Naps  Patient takes a purposeful nap Every day if I can times a week and naps are usually Varys between 20 minutes to 3-4 hours depending on if I have time in duration  She feels better after a nap: Yes  She dozes off  unintentionally 2-3 days days per week  Patient has had a driving accident or near-miss due to sleepiness/drowsiness: No      SLEEP DISRUPTIONS:    Breathing/Snoring  Patient snores:Yes  Other people complain about her snoring: No  Patient has been told she stops breathing in her sleep:No  She has issues with the following:      Movement:  Patient gets pain, discomfort, with an urge to move:  Yes (Occ)  It happens when she is resting:  Yes  It happens more at night:  Yes  Patient has been told she kicks her legs at night:  No     Behaviors in Sleep:  Twyla Mayen has experienced the following behaviors while sleeping: Recurring Nightmares;Sleep-talking;Sleepwalking;Teeth grinding  She has experienced sudden muscle weakness during the day: No      Is there anything else you would like your sleep provider to know: I have been dealing with this problem with varying severity as long as I can remember my identical twin has a similar problem. I want to go to PA school which has motivated me to try to address this problem.        CAFFEINE AND OTHER SUBSTANCES:    Patient consumes caffeinated beverages per day:  1 a day maximum and not everyday  Last caffeine use is usually: It varies usually I have caffeine earlier in the day if I need to study I have used caffeine very late and still able to not only nap but fall asleep at night  List of any prescribed or over the counter stimulants that patient takes: None  List of any prescribed or over the counter sleep medication patient takes: None  List of previous sleep medications that patient has tried: None  Patient drinks alcohol to help them sleep: No  Patient drinks alcohol near bedtime: No    Family History:  Patient has a family member been diagnosed with a sleep disorder: Yes  Dad has restless leg syndrome and sister being worked up for similar problem         SCALES:    EPWORTH SLEEPINESS SCALE         10/19/2024    12:30 PM    [place] Sleepiness Scale ( M.W. Johns   3057-4258<br>ESS - [place]/English - Final version - 21 Nov 07 - [facility] [place].)   Sitting and reading High chance of dozing   Watching TV Slight chance of dozing   Sitting, inactive in a public place (e.g. a theatre or a meeting) Moderate chance of dozing   As a passenger in a car for an hour without a break High chance of dozing   Lying down to rest in the afternoon when circumstances permit High chance of dozing   Sitting and talking to someone Slight chance of dozing   Sitting quietly after a lunch without alcohol High chance of dozing   In a car, while stopped for a few minutes in traffic Moderate chance of dozing   [place] Score (MC) 18   [place] Score (Sleep) 18         INSOMNIA SEVERITY INDEX (ZOE)          10/19/2024    12:16 PM   Insomnia Severity Index (ZOE)   Difficulty falling asleep 0   Difficulty staying asleep 0   Problems waking up too early 1   How SATISFIED/DISSATISFIED are you with your CURRENT sleep pattern? 2   How NOTICEABLE to others do you think your sleep problem is in terms of impairing the quality of your life? 3   How WORRIED/DISTRESSED are you about your current sleep problem? 3   To what extent do you consider your sleep problem to INTERFERE with your daily functioning (e.g. daytime fatigue, mood, ability to function at work/daily chores, concentration, memory, mood, etc.) CURRENTLY? 4   ZOE Total Score 13       Guidelines for Scoring/Interpretation:  Total score categories:  0-7 = No clinically significant insomnia   8-14 = Subthreshold insomnia   15-21 = Clinical insomnia (moderate severity)  22-28 = Clinical insomnia (severe)  Used via courtesy of www.[employer] Technologiesealth.va.gov with permission from [employer] PhD., [facility]      STOP BANG         10/22/2024     3:20 PM   STOP BANG Questionnaire (  2008, the American Society of Anesthesiologists, Inc. [employer]   BMI Clinic: 32.89         GAD7        1/31/2023     9:18 AM   JUANITO-7    1. Feeling  "nervous, anxious, or on edge 1   2. Not being able to stop or control worrying 1   3. Worrying too much about different things 1   4. Trouble relaxing 1   5. Being so restless that it is hard to sit still 0   6. Becoming easily annoyed or irritable 0   7. Feeling afraid, as if something awful might happen 0   JUANITO-7 Total Score 4         CAGE-AID         No data to display                CAGE-AID reprinted with permission from the [facility] Journal, SARINA Mcclellan. and SHAKIRA Bean, \"Conjoint screening questionnaires for alcohol and drug abuse\" [facility] Journal 94: 135-140, 1995.      PATIENT HEALTH QUESTIONNAIRE-9 (PHQ - 9)        1/31/2023     9:18 AM   PHQ-9 ([employer])   1.  Little interest or pleasure in doing things 1   2.  Feeling down, depressed, or hopeless 1   3.  Trouble falling or staying asleep, or sleeping too much 1   4.  Feeling tired or having little energy 3   5.  Poor appetite or overeating 1   6.  Feeling bad about yourself - or that you are a failure or have let yourself or your family down 1   7.  Trouble concentrating on things, such as reading the newspaper or watching television 1   8.  Moving or speaking so slowly that other people could have noticed. Or the opposite - being so fidgety or restless that you have been moving around a lot more than usual 0   9.  Thoughts that you would be better off dead, or of hurting yourself in some way 0   PHQ-9 Total Score 9   If you checked off any problems, how difficult have these problems made it for you to do your work, take care of things at home, or get along with other people? Somewhat difficult   6.  Feeling bad about yourself 1   7.  Trouble concentrating 1   8.  Moving slowly or restless 0   9.  Suicidal or self-harm thoughts 0   Difficulty at work, home, or with people Somewhat difficult       Developed by Kristine Ross, Gay Rojas, Luis Fernando Grimm and colleagues, with an educational jodi from [employer]. No permission " required to reproduce, translate, display or distribute.        Allergies:    No Known Allergies    Medications:    Current Outpatient Medications   Medication Sig Dispense Refill    LO LOESTRIN FE 1 MG-10 MCG / 10 MCG TABS See Admin Instructions      norethindrone-ethinyl estradiol (JUNEL FE 1/20) 1-20 MG-MCG tablet Take 1 tablet by mouth daily (Patient not taking: Reported on 2/7/2024) 90 tablet 1    norgestrel-ethinyl estradiol (LO/OVRAL) 0.3-30 MG-MCG tablet Take 1 tablet by mouth daily (Patient not taking: Reported on 2/7/2024) 90 tablet 4       Problem List:  Patient Active Problem List    Diagnosis Date Noted    Multiple benign nevi 03/18/2023     Priority: Medium    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  No past surgical history on file.    Social History:  Social History     Socioeconomic History    Marital status: Single     Spouse name: Not on file    Number of children: Not on file    Years of education: Not on file    Highest education level: Not on file   Occupational History    Occupation: EMT   Tobacco Use    Smoking status: Unknown    Smokeless tobacco: Never    Tobacco comments:     JOSHUA, minimally responsive.    Substance and Sexual Activity    Alcohol use: Yes    Drug use: Not on file    Sexual activity: Not on file   Other Topics Concern    Not on file   Social History Narrative    Not on file     Social Determinants of Health     Financial Resource Strain: Not on file   Food Insecurity: Not on file   Transportation Needs: Not on file   Physical Activity: Not on file   Stress: Not on file   Social Connections: Not on file   Interpersonal Safety: Not on file   Housing Stability: Not on file       Family History:  No family history on file.    Review of Systems:  A complete review of systems reviewed by me is negative with the exeption of what has been mentioned in the history of present  "illness.  In the last TWO WEEKS have you experienced any of the following symptoms?  Fevers: No  Night Sweats: No  Weight Gain: No  Pain at Night: No  Double Vision: No  Changes in Vision: No  Difficulty Breathing through Nose: No  Sore Throat in Morning: No  Dry Mouth in the Morning: No  Shortness of Breath Lying Flat: No  Shortness of Breath With Activity: No  Awakening with Shortness of Breath: No  Increased Cough: No  Heart Racing at Night: No  Swelling in Feet or Legs: No  Diarrhea at Night: No  Heartburn at Night: No  Urinating More than Once at Night: No  Losing Control of Urine at Night: No  Joint Pains at Night: No  Headaches in Morning: No  Weakness in Arms or Legs: No  Depressed Mood: No  Anxiety: No     Physical Examination:  Vitals: Ht 1.702 m (5' 7\")   Wt 95.3 kg (210 lb)   BMI 32.89 kg/m    BMI= Body mass index is 32.89 kg/m .           GENERAL APPEARANCE: alert and no distress  EYES: Eyes grossly normal to inspection  NECK: no asymmetry, masses, or scars  RESP: breathing is non-labored   NEURO: mentation intact and speech normal  PSYCH: affect normal/bright  Mallampati Class:   Tonsillar Stage:          Data: All pertinent previous laboratory data reviewed       Recent Labs   Lab Test 01/31/23  1004   WBC 8.9   RBC 4.41   HGB 13.3   HCT 39.9   MCV 91   MCH 30.2   MCHC 33.3   RDW 12.0          TSH (mU/L)   Date Value   01/31/2023 1.01           PETE Sandoval 10/22/2024           "

## 2024-10-23 ENCOUNTER — TELEPHONE (OUTPATIENT)
Dept: SCHEDULING | Facility: CLINIC | Age: 24
End: 2024-10-23
Payer: COMMERCIAL

## 2024-10-23 NOTE — TELEPHONE ENCOUNTER
General Call    Contacts       Contact Date/Time Type Contact Phone/Fax    10/23/2024 01:48 PM CDT Phone (Outgoing) Twyla Mayen (Self) [phone] (H)          Reason for Call:  Pt with sleep study ordered with MSLT and Actigraphy noted on order. Please contact to schedule.     What are your questions or concerns:  na     Date of last appointment with provider: na    Could we send this information to you in [place] or would you prefer to receive a phone call?:   Patient would prefer a phone call   Okay to leave a detailed message?: Yes at Cell number on file:    Telephone Information:   Mobile [phone]

## 2024-10-25 ENCOUNTER — TELEPHONE (OUTPATIENT)
Dept: SLEEP MEDICINE | Facility: CLINIC | Age: 24
End: 2024-10-25
Payer: COMMERCIAL

## 2024-11-30 ENCOUNTER — HEALTH MAINTENANCE LETTER (OUTPATIENT)
Age: 24
End: 2024-11-30

## 2025-02-10 ENCOUNTER — OFFICE VISIT (OUTPATIENT)
Dept: SLEEP MEDICINE | Facility: CLINIC | Age: 25
End: 2025-02-10
Payer: COMMERCIAL

## 2025-02-10 DIAGNOSIS — E66.811 CLASS 1 OBESITY DUE TO EXCESS CALORIES WITH BODY MASS INDEX (BMI) OF 32.0 TO 32.9 IN ADULT, UNSPECIFIED WHETHER SERIOUS COMORBIDITY PRESENT: Primary | Chronic | ICD-10-CM

## 2025-02-10 DIAGNOSIS — E66.09 CLASS 1 OBESITY DUE TO EXCESS CALORIES WITH BODY MASS INDEX (BMI) OF 32.0 TO 32.9 IN ADULT, UNSPECIFIED WHETHER SERIOUS COMORBIDITY PRESENT: Primary | Chronic | ICD-10-CM

## 2025-02-10 NOTE — PROGRESS NOTES
Patient presented to clinic for  and demonstration of the atigraphy watch. Patient was set up and instructed use. Patient verbalized understanding and demonstrated set up back to me. Patient will be returning device by 02/24/24.    Patient was given sleep logs and written instructions for use.    Annamaria Diaz , Home Sleep Studies Specialist  [place]  / formerly [facility]

## 2025-02-19 ASSESSMENT — SLEEP AND FATIGUE QUESTIONNAIRES
HOW LIKELY ARE YOU TO NOD OFF OR FALL ASLEEP WHILE SITTING AND READING: HIGH CHANCE OF DOZING
HOW LIKELY ARE YOU TO NOD OFF OR FALL ASLEEP WHILE WATCHING TV: MODERATE CHANCE OF DOZING
HOW LIKELY ARE YOU TO NOD OFF OR FALL ASLEEP WHILE SITTING INACTIVE IN A PUBLIC PLACE: MODERATE CHANCE OF DOZING
HOW LIKELY ARE YOU TO NOD OFF OR FALL ASLEEP WHILE LYING DOWN TO REST IN THE AFTERNOON WHEN CIRCUMSTANCES PERMIT: HIGH CHANCE OF DOZING
HOW LIKELY ARE YOU TO NOD OFF OR FALL ASLEEP WHILE SITTING QUIETLY AFTER LUNCH WITHOUT ALCOHOL: MODERATE CHANCE OF DOZING
HOW LIKELY ARE YOU TO NOD OFF OR FALL ASLEEP IN A CAR, WHILE STOPPED FOR A FEW MINUTES IN TRAFFIC: SLIGHT CHANCE OF DOZING
HOW LIKELY ARE YOU TO NOD OFF OR FALL ASLEEP WHILE SITTING AND TALKING TO SOMEONE: SLIGHT CHANCE OF DOZING
HOW LIKELY ARE YOU TO NOD OFF OR FALL ASLEEP WHEN YOU ARE A PASSENGER IN A CAR FOR AN HOUR WITHOUT A BREAK: HIGH CHANCE OF DOZING

## 2025-02-23 ENCOUNTER — THERAPY VISIT (OUTPATIENT)
Dept: SLEEP MEDICINE | Facility: CLINIC | Age: 25
End: 2025-02-23
Payer: COMMERCIAL

## 2025-02-23 DIAGNOSIS — G47.19 EXCESSIVE DAYTIME SLEEPINESS: ICD-10-CM

## 2025-02-23 DIAGNOSIS — G47.10 ORGANIC HYPERSOMNIA: ICD-10-CM

## 2025-02-23 DIAGNOSIS — R06.00 DYSPNEA AND RESPIRATORY ABNORMALITY: ICD-10-CM

## 2025-02-23 DIAGNOSIS — Z72.820 LACK OF ADEQUATE SLEEP: ICD-10-CM

## 2025-02-23 DIAGNOSIS — E66.09 CLASS 1 OBESITY DUE TO EXCESS CALORIES WITH BODY MASS INDEX (BMI) OF 32.0 TO 32.9 IN ADULT, UNSPECIFIED WHETHER SERIOUS COMORBIDITY PRESENT: ICD-10-CM

## 2025-02-23 DIAGNOSIS — E66.811 CLASS 1 OBESITY DUE TO EXCESS CALORIES WITH BODY MASS INDEX (BMI) OF 32.0 TO 32.9 IN ADULT, UNSPECIFIED WHETHER SERIOUS COMORBIDITY PRESENT: ICD-10-CM

## 2025-02-23 DIAGNOSIS — R06.89 DYSPNEA AND RESPIRATORY ABNORMALITY: ICD-10-CM

## 2025-02-24 ENCOUNTER — DOCUMENTATION ONLY (OUTPATIENT)
Dept: SLEEP MEDICINE | Facility: CLINIC | Age: 25
End: 2025-02-24
Payer: COMMERCIAL

## 2025-02-24 ENCOUNTER — THERAPY VISIT (OUTPATIENT)
Dept: SLEEP MEDICINE | Facility: CLINIC | Age: 25
End: 2025-02-24
Payer: COMMERCIAL

## 2025-02-24 LAB
AMPHETAMINES UR QL SCN: NORMAL
BARBITURATES UR QL SCN: NORMAL
BENZODIAZ UR QL SCN: NORMAL
BZE UR QL SCN: NORMAL
CANNABINOIDS UR QL SCN: NORMAL
FENTANYL UR QL: NORMAL
OPIATES UR QL SCN: NORMAL
PCP QUAL URINE (ROCHE): NORMAL

## 2025-02-24 PROCEDURE — 80307 DRUG TEST PRSMV CHEM ANLYZR: CPT

## 2025-02-24 NOTE — PATIENT INSTRUCTIONS
[place] [facility]    1. Your sleep study will be reviewed by a sleep physician.     2. Please follow up in the sleep clinic as scheduled, or, make an appointment with your sleep provider to be seen in 3 months.    3. If you have any questions or problems with your treatment plan, please contact your sleep clinic provider at [phone] to further manage your condition.    4. Please review your attached medication list, and, at your follow-up appointment advise your sleep clinic provider about any changes.    5. Go to [email or web address] for more information about your sleep problems.    MAURICIO Short  February 24, 2025

## 2025-02-25 ENCOUNTER — TELEPHONE (OUTPATIENT)
Dept: SLEEP MEDICINE | Facility: CLINIC | Age: 25
End: 2025-02-25

## 2025-02-25 DIAGNOSIS — E66.811 CLASS 1 OBESITY DUE TO EXCESS CALORIES WITH BODY MASS INDEX (BMI) OF 32.0 TO 32.9 IN ADULT, UNSPECIFIED WHETHER SERIOUS COMORBIDITY PRESENT: Primary | Chronic | ICD-10-CM

## 2025-02-25 DIAGNOSIS — E66.09 CLASS 1 OBESITY DUE TO EXCESS CALORIES WITH BODY MASS INDEX (BMI) OF 32.0 TO 32.9 IN ADULT, UNSPECIFIED WHETHER SERIOUS COMORBIDITY PRESENT: Primary | Chronic | ICD-10-CM

## 2025-02-25 LAB — SLPCOMP: NORMAL

## 2025-02-25 NOTE — TELEPHONE ENCOUNTER
Reason for Call:  Appointment Request    Patient requesting this type of appt:  SLEEP FOLLOW UP    Requested provider:  MONA RADFORD    Reason patient unable to be scheduled: Not within requested timeframe    When does patient want to be seen/preferred time:  Mid April     Comments: Pt will be leaving for college and would like to have the follow up appt from 02/24 sleep study in April rather than July (which is next avail appt) Please contact Twyla with options    Could we send this information to you in [place] or would you prefer to receive a phone call?:   Patient would like to be contacted via [place]    Call taken on 2/25/2025 at 9:41 AM by Angela Daily

## 2025-03-03 LAB — SLPCOMP: NORMAL

## 2025-03-12 LAB — SLPCOMP: NORMAL

## 2025-05-13 NOTE — TELEPHONE ENCOUNTER
There was an appointment cancellation for tomorrow with DANN Richey. Scheduled patient as she needed a sooner appointment. Asked her to call back and cancel tomorrow's appointment if it doesn't work. If it does work, we will need to cancel July appointment.  Alexa Ferguson, CMA

## 2025-07-15 ASSESSMENT — SLEEP AND FATIGUE QUESTIONNAIRES
HOW LIKELY ARE YOU TO NOD OFF OR FALL ASLEEP WHILE SITTING AND READING: HIGH CHANCE OF DOZING
HOW LIKELY ARE YOU TO NOD OFF OR FALL ASLEEP WHILE SITTING QUIETLY AFTER LUNCH WITHOUT ALCOHOL: HIGH CHANCE OF DOZING
HOW LIKELY ARE YOU TO NOD OFF OR FALL ASLEEP WHILE WATCHING TV: MODERATE CHANCE OF DOZING
HOW LIKELY ARE YOU TO NOD OFF OR FALL ASLEEP WHILE SITTING AND TALKING TO SOMEONE: SLIGHT CHANCE OF DOZING
HOW LIKELY ARE YOU TO NOD OFF OR FALL ASLEEP IN A CAR, WHILE STOPPED FOR A FEW MINUTES IN TRAFFIC: SLIGHT CHANCE OF DOZING
HOW LIKELY ARE YOU TO NOD OFF OR FALL ASLEEP WHILE SITTING INACTIVE IN A PUBLIC PLACE: MODERATE CHANCE OF DOZING
HOW LIKELY ARE YOU TO NOD OFF OR FALL ASLEEP WHILE LYING DOWN TO REST IN THE AFTERNOON WHEN CIRCUMSTANCES PERMIT: HIGH CHANCE OF DOZING
HOW LIKELY ARE YOU TO NOD OFF OR FALL ASLEEP WHEN YOU ARE A PASSENGER IN A CAR FOR AN HOUR WITHOUT A BREAK: HIGH CHANCE OF DOZING

## 2025-07-16 ENCOUNTER — OFFICE VISIT (OUTPATIENT)
Dept: SLEEP MEDICINE | Facility: CLINIC | Age: 25
End: 2025-07-16
Payer: COMMERCIAL

## 2025-07-16 VITALS
SYSTOLIC BLOOD PRESSURE: 137 MMHG | RESPIRATION RATE: 12 BRPM | OXYGEN SATURATION: 100 % | HEIGHT: 67 IN | DIASTOLIC BLOOD PRESSURE: 73 MMHG | WEIGHT: 199.2 LBS | HEART RATE: 73 BPM | BODY MASS INDEX: 31.27 KG/M2

## 2025-07-16 DIAGNOSIS — E66.811 CLASS 1 OBESITY DUE TO EXCESS CALORIES WITH BODY MASS INDEX (BMI) OF 32.0 TO 32.9 IN ADULT, UNSPECIFIED WHETHER SERIOUS COMORBIDITY PRESENT: Primary | Chronic | ICD-10-CM

## 2025-07-16 DIAGNOSIS — E66.09 CLASS 1 OBESITY DUE TO EXCESS CALORIES WITH BODY MASS INDEX (BMI) OF 32.0 TO 32.9 IN ADULT, UNSPECIFIED WHETHER SERIOUS COMORBIDITY PRESENT: Primary | Chronic | ICD-10-CM

## 2025-07-16 PROCEDURE — 3075F SYST BP GE 130 - 139MM HG: CPT | Performed by: PHYSICIAN ASSISTANT

## 2025-07-16 PROCEDURE — 1126F AMNT PAIN NOTED NONE PRSNT: CPT | Performed by: PHYSICIAN ASSISTANT

## 2025-07-16 PROCEDURE — 3078F DIAST BP <80 MM HG: CPT | Performed by: PHYSICIAN ASSISTANT

## 2025-07-16 PROCEDURE — 99214 OFFICE O/P EST MOD 30 MIN: CPT | Performed by: PHYSICIAN ASSISTANT

## 2025-07-16 NOTE — PROGRESS NOTES
Sleep Study Follow-Up Visit:    Date on this visit: 7/16/2025    comes in today for follow-up of her acigtraphy, sleep study, MSLT done on 2/23/205 at the [facility] for Excessive daytime sleepiness.     Actigraphy  o Dates of recording - From 02/10/2025 to 02/23/2025.  o Quality - Good.  o Sleep diary - Correlates well.  o Bed Time - Average at 11:41 PM.  o Get Up Time - Average at 7:43 AM.  o Time in Bed - Average at 8 hours.  o Total Sleep Time - Average at 6.6 hours.  ? Minimum 5.7 hours.  ? Maximum 7.7 hours.  o Sleep Onset Delay - Delays were observed.  o Sleep Periods - Consolidated.  o Light exposure periods - Mostly appropriately timed to sleep schedule.  o Napping - Noted on 12 out of 14 days.  Actigraphy Interpretation: Sleep wake pattern is consistent with:  o Chronically inadequate sleep  o Sleep phase delay    Polysomnogram-   Sleep Architecture: The patient s sleep architecture was fragmented with increased arousal index. The patient had an elevated   sleep latency.  The total recording time of the polysomnogram was 545.4 minutes. The total sleep time was 356.0 minutes. Sleep latency was   increased at 34.9 minutes without the use of a sleep aid. REM latency was 237.5 minutes. Arousal index was increased at 30.0  arousals per hour. Sleep efficiency was increased at 65.3%. Wake after sleep onset was 154.0 minutes. The patient spent 9.6% of   total sleep time in Stage N1, 58.8% in Stage N2, 12.5% in Stage N3, and 19.1% in REM. Time in REM supine was 68.0 minutes.  Respiration: Within normal limits.   Events ? The polysomnogram revealed a presence of - obstructive, 1 central, and - mixed apneas resulting in an apnea   index of 0.2 events per hour. There were 2 obstructive hypopneas and - central hypopneas resulting in an obstructive   hypopnea index of 0.3 and central hypopnea index of - events per hour. The combined apnea/hypopnea index was 0.5  events per hour (central  apnea/hypopnea index was 0.2 events per hour). The REM AHI was 1.8 events per hour. The   supine AHI was 0.6 events per hour. The RERA index was 0.3 events per hour. The RDI was 0.8 events per hour.   Snoring - was reported as absent.   Respiratory rate and pattern - were notable for occasional obstructive hypopneas.   Sleep Associated Hypoxemia - (Greater than 5 minutes O2 sat at or below 88%) was not present. Baseline oxygen   saturation was 96.1%. Lowest oxygen saturation was 87.0%. Time spent less than or equal to 88% was 0.1 minutes. Time   spent less than or equal to 89% was 0.3 minutes.  Movement Activity: Borderline transient muscle activity during stage REM was appreciated.   Periodic Limb Activity - There were - PLMs during the entire study. The PLM index was - movements per hour. The PLM   Arousal Index was - per hour.   REM EMG Activity - borderline transient muscle activity was present.   Nocturnal Behavior - Abnormal sleep related behaviors were not noted during sleep.    Bruxism - Nonapparent.  Cardiac Summary: Within normal limits.  The average pulse rate was 75.8 bpm. The minimum pulse rate was 51.0 bpm while the maximum pulse rate was 123.0 bpm.   Arrhythmias were not noted.  Pre PSG-Evaluation: The patient s actigraphy/sleep log is suggestive of circadian phase delay and chronically inadequate amount   of sleep    Multiple sleep latency test-  Mean latency 9.7 minutes, 0 sleep onset REMs noted    Urine toxicity screen  Negative.        These findings were reviewed with patient.     Past medical/surgical history, family history, social history, medications and allergies were reviewed.      Problem List:  Patient Active Problem List    Diagnosis Date Noted    Class 1 obesity due to excess calories with body mass index (BMI) of 32.0 to 32.9 in adult, unspecified whether serious comorbidity present 10/22/2024     Priority: Medium    Multiple benign nevi 03/18/2023     Priority: Medium    Neoplasm of  uncertain behavior of skin 03/18/2023     Priority: Medium          Assessment:  Patient presents with hypersomnia-  Actigraphy shows chronically inadequate sleep and delayed sleep phase.  PSG negative for sleep-disordered breathing.  MSLT with mean sleep latency (MSL) of 9.7 minutes, which is within normal limits, negative for pathological sleepiness.  Urine drug screen (UDS) negative.  Overall findings consistent with delayed sleep phase syndrome contributing to symptoms rather than primary hypersomnia disorder.    Plan:  Treat delayed sleep phase syndrome:  Initiate low-dose melatonin in the early evening to help advance sleep onset.  Recommended daily morning bright light therapy (SAD light) upon waking to help reset circadian rhythm.  Recommended keeping a sleep log.   Address chronically inadequate sleep:  Reinforced good sleep hygiene and encouraged consistent bedtime and wake time including on the weekends.  Monitor symptoms:  Schedule follow-up in 12 weeks to assess response to treatment.    Patient was strongly advised to avoid driving, operating any heavy machinery or other hazardous situations while drowsy or sleepy. Patient was counseled on the importance of driving while alert, to pull over if drowsy, or nap before getting into the vehicle if sleepy.      Dony Richey PA-C  Sleep Medicine

## 2025-07-16 NOTE — PATIENT INSTRUCTIONS
Instructions for treating Delayed Sleep Phase Syndrome:    Delayed Sleep Phase Syndrome (DSPS) means that your body's internal timing is set late compared to the 24 hour day. Therefore, it is often difficult to get up on time for work in the morning and sometimes difficult to fall asleep on time, in order to get enough sleep. People with DSPS often tend to like to stay up late on weekends and sleep in until between 10 AM and noon, sometimes even later.  This is actually a bad habit that will perpetuate the problem. It reinforces your body's tendency to be on that later schedule. Keeping the same wake time on all days (or as close as possible) will help your body maintain any advancement you make to your circadian rhythms.    You should go to bed when you are sleepy and ready to sleep. During this entire process, you should not engage in activities that may make it worse, such as watching TV in bed, leaving the TV on all night, drinking any caffeine 6 hours before bed or exercising 1-2 hours before bed.     Start taking Melatonin, 0.5-1 mg tablet 3-5 hours before the time that you fall asleep on average (not your desired bedtime or time that you get in bed, but the time you normally fall asleep on your own).     Upon awakening, get exposure to sun-light for about 30-45 minutes. You do not need to look at the sun, in fact, this is dangerous. Reading the paper with the sun shining on you is adequate.  Alternatively, you may use a Seasonal Affective Disorder Lamp (intensity 10,000 Lux) instead of the sun. The lamp should be positioned 1-2 arms lengths away from you. They lamps are sold at Home [employer] Companies such as [employer] or [email or web address]. A prescription can be written to get insurance coverage in some cases. They are also sold on [employer]. Consider the [employer] Happy Light [employer].    Using the light and melatonin should help march your internal clock (known as your circadian rhythms) gradually  earlier. As your bedtime advances, remember to take your melatonin earlier, keeping it 5 hours before your fall asleep time.    Avoid naps and sleeping in because sleeping during the day will delay your body's clock and you will have to start from scratch.     More information about light therapy:  If the cost of any light box is too much, you can also purchase a compact fluorescent all spectrum light bulb at a local hardware store for about $8.  The most commonly available bulb is 1400 lumen.  You would need two of these positioned within 1 meter of yourself to be equivalent to 2,500 lux.  The bulbs can be placed in a standard light fixture.  Additionally, they can be placed in a mountable fixture that is used in greenhouses.  Mountable fixtures are also available at hardware stores for about $9.  Do not look directly at the light.  If you have any concerns regarding the safety of bright light therapy for you, it is recommended that you consult an ophthalmologist before using a light box.  If you have a condition that makes your eyes very sensitive to light, macular degeneration, a family history of such problems, or diabetic changes to your eyes, consult an ophthalmologist before using a light box. If you have anxiety disorder and have an increase in anxiety discontinue use. Instructions for treating Delayed Sleep Phase Syndrome:    Delayed Sleep Phase Syndrome (DSPS) means that your body's internal timing is set late compared to the 24 hour day. Therefore, it is often difficult to get up on time for work in the morning and sometimes difficult to fall asleep on time, in order to get enough sleep. People with DSPS often tend to like to stay up late on weekends and sleep in until between 10 AM and noon, sometimes even later.  This is actually a bad habit that will perpetuate the problem. It reinforces your body's tendency to be on that later schedule. Keeping the same wake time on all days (or as close as possible)  will help your body maintain any advancement you make to your circadian rhythms.    You should go to bed when you are sleepy and ready to sleep. During this entire process, you should not engage in activities that may make it worse, such as watching TV in bed, leaving the TV on all night, drinking any caffeine 6 hours before bed or exercising 1-2 hours before bed.     Start taking Melatonin, 0.5-1 mg tablet 3-5 hours before the time that you fall asleep on average (not your desired bedtime or time that you get in bed, but the time you normally fall asleep on your own).     Upon awakening, get exposure to sun-light for about 30-45 minutes. You do not need to look at the sun, in fact, this is dangerous. Reading the paper with the sun shining on you is adequate.  Alternatively, you may use a Seasonal Affective Disorder Lamp (intensity 10,000 Lux) instead of the sun. The lamp should be positioned 1-2 arms lengths away from you. They lamps are sold at Home Medical Companies such as [employer] or [employer]. A prescription can be written to get insurance coverage in some cases. They are also sold on [employer]. Consider the [employer] Happy Light Lucent.    Using the light and melatonin should help march your internal clock (known as your circadian rhythms) gradually earlier. As your bedtime advances, remember to take your melatonin earlier, keeping it 5 hours before your fall asleep time.    Avoid naps and sleeping in because sleeping during the day will delay your body's clock and you will have to start from scratch.     More information about light therapy:  If the cost of any light box is too much, you can also purchase a compact fluorescent all spectrum light bulb at a local hardware store for about $8.  The most commonly available bulb is 1400 lumen.  You would need two of these positioned within 1 meter of yourself to be equivalent to 2,500 lux.  The bulbs can be placed in a standard light fixture.   Additionally, they can be placed in a mountable fixture that is used in greenhouses.  Mountable fixtures are also available at [employer] stores for about $9.  Do not look directly at the light.  If you have any concerns regarding the safety of bright light therapy for you, it is recommended that you consult an ophthalmologist before using a light box.  If you have a condition that makes your eyes very sensitive to light, macular degeneration, a family history of such problems, or diabetic changes to your eyes, consult an ophthalmologist before using a light box. If you have anxiety disorder and have an increase in anxiety discontinue use.   Your Body mass index is 31.2 kg/m .  Weight management is a personal decision.  If you are interested in exploring weight loss strategies, the following discussion covers the approaches that may be successful. Body mass index (BMI) is one way to tell whether you are at a healthy weight, overweight, or obese. It measures your weight in relation to your height.  A BMI of 18.5 to 24.9 is in the healthy range. A person with a BMI of 25 to 29.9 is considered overweight, and someone with a BMI of 30 or greater is considered obese. More than two-thirds of [place] adults are considered overweight or obese.  Being overweight or obese increases the risk for further weight gain. Excess weight may lead to heart disease and diabetes.  Creating and following plans for healthy eating and physical activity may help you improve your health.  Weight control is part of healthy lifestyle and includes exercise, emotional health, and healthy eating habits. Careful eating habits lifelong are the mainstay of weight control. Though there are significant health benefits from weight loss, long-term weight loss with diet alone may be very difficult to achieve- studies show long-term success with dietary management in less than 10% of people. Attaining a healthy weight may be especially difficult to achieve  in those with severe obesity. In some cases, medications, devices and surgical management might be considered.  What can you do?  If you are overweight or obese and are interested in methods for weight loss, you should discuss this with your provider.   Consider reducing daily calorie intake by 500 calories.   Keep a food journal.   Avoiding skipping meals, consider cutting portions instead.    Diet combined with exercise helps maintain muscle while optimizing fat loss. Strength training is particularly important for building and maintaining muscle mass. Exercise helps reduce stress, increase energy, and improves fitness. Increasing exercise without diet control, however, may not burn enough calories to loose weight.     Start walking three days a week 10-20 minutes at a time  Work towards walking thirty minutes five days a week   Eventually, increase the speed of your walking for 1-2 minutes at time    In addition, we recommend that you review healthy lifestyles and methods for weight loss available through the National [employer] of Health patient information sites:  http://win.niddk.nih.gov/publications/index.htm    And look into health and wellness programs that may be available through your health insurance provider, employer, local community center, or [employer]agan club.

## 2025-07-16 NOTE — NURSING NOTE
"Chief Complaint   Patient presents with    Study Results       Initial /73   Pulse 73   Resp 12   Ht 1.702 m (5' 7\")   Wt 90.4 kg (199 lb 3.2 oz)   SpO2 100%   BMI 31.20 kg/m   Estimated body mass index is 31.2 kg/m  as calculated from the following:    Height as of this encounter: 1.702 m (5' 7\").    Weight as of this encounter: 90.4 kg (199 lb 3.2 oz).    Medication Reconciliation: complete  ESS: 18  Neck circumference: 13.75 inches / 35 centimeters.  DME: N/A  Alexa Ferguson CMA      "